# Patient Record
Sex: FEMALE | Race: WHITE | NOT HISPANIC OR LATINO | Employment: OTHER | URBAN - METROPOLITAN AREA
[De-identification: names, ages, dates, MRNs, and addresses within clinical notes are randomized per-mention and may not be internally consistent; named-entity substitution may affect disease eponyms.]

---

## 2018-02-27 ENCOUNTER — HOSPITAL ENCOUNTER (OUTPATIENT)
Dept: RADIOLOGY | Facility: MEDICAL CENTER | Age: 73
End: 2018-02-27

## 2018-02-27 ENCOUNTER — APPOINTMENT (OUTPATIENT)
Dept: RADIOLOGY | Facility: MEDICAL CENTER | Age: 73
DRG: 064 | End: 2018-02-27
Attending: EMERGENCY MEDICINE
Payer: MEDICARE

## 2018-02-27 ENCOUNTER — APPOINTMENT (OUTPATIENT)
Dept: RADIOLOGY | Facility: MEDICAL CENTER | Age: 73
DRG: 064 | End: 2018-02-27
Attending: NEUROLOGICAL SURGERY
Payer: MEDICARE

## 2018-02-27 ENCOUNTER — HOSPITAL ENCOUNTER (INPATIENT)
Facility: MEDICAL CENTER | Age: 73
LOS: 13 days | DRG: 064 | End: 2018-03-13
Attending: EMERGENCY MEDICINE | Admitting: INTERNAL MEDICINE
Payer: MEDICARE

## 2018-02-27 ENCOUNTER — RESOLUTE PROFESSIONAL BILLING HOSPITAL PROF FEE (OUTPATIENT)
Dept: MEDSURG UNIT | Facility: MEDICAL CENTER | Age: 73
End: 2018-02-27
Payer: MEDICARE

## 2018-02-27 DIAGNOSIS — I67.1 CEREBRAL ANEURYSM: ICD-10-CM

## 2018-02-27 DIAGNOSIS — I60.9 SUBARACHNOID HEMORRHAGE (HCC): ICD-10-CM

## 2018-02-27 DIAGNOSIS — R41.0 DELIRIUM: ICD-10-CM

## 2018-02-27 PROBLEM — R41.82 ALTERED MENTAL STATUS, UNSPECIFIED: Status: ACTIVE | Noted: 2018-02-27

## 2018-02-27 PROBLEM — D69.1 PLATELET DYSFUNCTION DUE TO ASPIRIN (HCC): Status: ACTIVE | Noted: 2018-02-27

## 2018-02-27 PROBLEM — E78.5 HYPERLIPIDEMIA: Status: ACTIVE | Noted: 2018-02-27

## 2018-02-27 PROBLEM — R40.4 ALTERED LEVEL OF CONSCIOUSNESS: Status: ACTIVE | Noted: 2018-02-27

## 2018-02-27 PROBLEM — I10 ESSENTIAL HYPERTENSION: Status: ACTIVE | Noted: 2018-02-27

## 2018-02-27 PROBLEM — T39.015A PLATELET DYSFUNCTION DUE TO ASPIRIN (HCC): Status: ACTIVE | Noted: 2018-02-27

## 2018-02-27 LAB
CFT BLD TEG: 3.2 MIN (ref 5–10)
CLOT ANGLE BLD TEG: 72.8 DEGREES (ref 53–72)
CLOT LYSIS 30M P MA LENFR BLD TEG: 3 % (ref 0–8)
CT.EXTRINSIC BLD ROTEM: 1.2 MIN (ref 1–3)
MCF BLD TEG: 65.5 MM (ref 50–70)
PA AA BLD-ACNC: 52.5 %
PA ADP BLD-ACNC: 5.5 %
TEG ALGORITHM TGALG: ABNORMAL

## 2018-02-27 PROCEDURE — G0390 TRAUMA RESPONS W/HOSP CRITI: HCPCS

## 2018-02-27 PROCEDURE — 70496 CT ANGIOGRAPHY HEAD: CPT

## 2018-02-27 PROCEDURE — 85610 PROTHROMBIN TIME: CPT

## 2018-02-27 PROCEDURE — 86140 C-REACTIVE PROTEIN: CPT

## 2018-02-27 PROCEDURE — 85347 COAGULATION TIME ACTIVATED: CPT

## 2018-02-27 PROCEDURE — 84439 ASSAY OF FREE THYROXINE: CPT

## 2018-02-27 PROCEDURE — 82607 VITAMIN B-12: CPT

## 2018-02-27 PROCEDURE — 85730 THROMBOPLASTIN TIME PARTIAL: CPT

## 2018-02-27 PROCEDURE — 86780 TREPONEMA PALLIDUM: CPT

## 2018-02-27 PROCEDURE — 99291 CRITICAL CARE FIRST HOUR: CPT

## 2018-02-27 PROCEDURE — 85025 COMPLETE CBC W/AUTO DIFF WBC: CPT

## 2018-02-27 PROCEDURE — 82140 ASSAY OF AMMONIA: CPT

## 2018-02-27 PROCEDURE — 83735 ASSAY OF MAGNESIUM: CPT

## 2018-02-27 PROCEDURE — 82746 ASSAY OF FOLIC ACID SERUM: CPT

## 2018-02-27 PROCEDURE — 80053 COMPREHEN METABOLIC PANEL: CPT

## 2018-02-27 PROCEDURE — 82550 ASSAY OF CK (CPK): CPT

## 2018-02-27 PROCEDURE — A9270 NON-COVERED ITEM OR SERVICE: HCPCS | Performed by: NEUROLOGICAL SURGERY

## 2018-02-27 PROCEDURE — 84443 ASSAY THYROID STIM HORMONE: CPT

## 2018-02-27 PROCEDURE — 85652 RBC SED RATE AUTOMATED: CPT

## 2018-02-27 PROCEDURE — 84484 ASSAY OF TROPONIN QUANT: CPT

## 2018-02-27 PROCEDURE — 85384 FIBRINOGEN ACTIVITY: CPT

## 2018-02-27 PROCEDURE — 85576 BLOOD PLATELET AGGREGATION: CPT | Mod: 91

## 2018-02-27 PROCEDURE — 700102 HCHG RX REV CODE 250 W/ 637 OVERRIDE(OP): Performed by: NEUROLOGICAL SURGERY

## 2018-02-27 PROCEDURE — G0378 HOSPITAL OBSERVATION PER HR: HCPCS

## 2018-02-27 RX ORDER — ACETAMINOPHEN 500 MG
500 TABLET ORAL EVERY 6 HOURS PRN
Status: DISCONTINUED | OUTPATIENT
Start: 2018-02-27 | End: 2018-02-28

## 2018-02-27 RX ORDER — SIMVASTATIN 20 MG
20 TABLET ORAL EVERY EVENING
COMMUNITY

## 2018-02-27 RX ORDER — LEVETIRACETAM 100 MG/ML
500 SOLUTION ORAL EVERY 12 HOURS
Status: DISCONTINUED | OUTPATIENT
Start: 2018-02-27 | End: 2018-03-02

## 2018-02-27 RX ORDER — LISINOPRIL 20 MG/1
20 TABLET ORAL DAILY
COMMUNITY

## 2018-02-27 RX ORDER — LISINOPRIL 20 MG/1
20 TABLET ORAL DAILY
Status: DISCONTINUED | OUTPATIENT
Start: 2018-02-28 | End: 2018-02-28

## 2018-02-27 RX ORDER — SIMVASTATIN 20 MG
20 TABLET ORAL EVERY EVENING
Status: DISCONTINUED | OUTPATIENT
Start: 2018-02-27 | End: 2018-03-13 | Stop reason: HOSPADM

## 2018-02-27 RX ORDER — ASPIRIN/CALCIUM/MAG/ALUMINUM 325 MG
325 TABLET ORAL DAILY
Status: ON HOLD | COMMUNITY
End: 2018-03-13

## 2018-02-27 RX ADMIN — LEVETIRACETAM 500 MG: 100 SOLUTION ORAL at 23:24

## 2018-02-28 ENCOUNTER — APPOINTMENT (OUTPATIENT)
Dept: RADIOLOGY | Facility: MEDICAL CENTER | Age: 73
DRG: 064 | End: 2018-02-28
Attending: STUDENT IN AN ORGANIZED HEALTH CARE EDUCATION/TRAINING PROGRAM
Payer: MEDICARE

## 2018-02-28 PROBLEM — E87.6 HYPOKALEMIA: Status: ACTIVE | Noted: 2018-02-28

## 2018-02-28 PROBLEM — I67.1 CEREBRAL ANEURYSM: Status: ACTIVE | Noted: 2018-02-28

## 2018-02-28 LAB
ALBUMIN SERPL BCP-MCNC: 3.7 G/DL (ref 3.2–4.9)
ALBUMIN/GLOB SERPL: 1.4 G/DL
ALP SERPL-CCNC: 53 U/L (ref 30–99)
ALT SERPL-CCNC: 13 U/L (ref 2–50)
AMMONIA PLAS-SCNC: 32 UMOL/L (ref 11–45)
ANION GAP SERPL CALC-SCNC: 13 MMOL/L (ref 0–11.9)
APTT PPP: 25.2 SEC (ref 24.7–36)
AST SERPL-CCNC: 22 U/L (ref 12–45)
BASOPHILS # BLD AUTO: 0.3 % (ref 0–1.8)
BASOPHILS # BLD: 0.02 K/UL (ref 0–0.12)
BILIRUB SERPL-MCNC: 0.7 MG/DL (ref 0.1–1.5)
BUN SERPL-MCNC: 23 MG/DL (ref 8–22)
CALCIUM SERPL-MCNC: 9.1 MG/DL (ref 8.5–10.5)
CHLORIDE SERPL-SCNC: 111 MMOL/L (ref 96–112)
CK SERPL-CCNC: 136 U/L (ref 0–154)
CO2 SERPL-SCNC: 20 MMOL/L (ref 20–33)
CREAT SERPL-MCNC: 0.66 MG/DL (ref 0.5–1.4)
CRP SERPL HS-MCNC: 0.69 MG/DL (ref 0–0.75)
EOSINOPHIL # BLD AUTO: 0.03 K/UL (ref 0–0.51)
EOSINOPHIL NFR BLD: 0.4 % (ref 0–6.9)
ERYTHROCYTE [DISTWIDTH] IN BLOOD BY AUTOMATED COUNT: 49.7 FL (ref 35.9–50)
ERYTHROCYTE [SEDIMENTATION RATE] IN BLOOD BY WESTERGREN METHOD: 14 MM/HOUR (ref 0–30)
FOLATE SERPL-MCNC: >23.5 NG/ML
GLOBULIN SER CALC-MCNC: 2.6 G/DL (ref 1.9–3.5)
GLUCOSE SERPL-MCNC: 75 MG/DL (ref 65–99)
HCT VFR BLD AUTO: 39.1 % (ref 37–47)
HGB BLD-MCNC: 12.4 G/DL (ref 12–16)
IMM GRANULOCYTES # BLD AUTO: 0.02 K/UL (ref 0–0.11)
IMM GRANULOCYTES NFR BLD AUTO: 0.3 % (ref 0–0.9)
INR PPP: 1.22 (ref 0.87–1.13)
LYMPHOCYTES # BLD AUTO: 1.61 K/UL (ref 1–4.8)
LYMPHOCYTES NFR BLD: 23.9 % (ref 22–41)
MAGNESIUM SERPL-MCNC: 1.9 MG/DL (ref 1.5–2.5)
MCH RBC QN AUTO: 29.9 PG (ref 27–33)
MCHC RBC AUTO-ENTMCNC: 31.7 G/DL (ref 33.6–35)
MCV RBC AUTO: 94.2 FL (ref 81.4–97.8)
MONOCYTES # BLD AUTO: 0.82 K/UL (ref 0–0.85)
MONOCYTES NFR BLD AUTO: 12.2 % (ref 0–13.4)
NEUTROPHILS # BLD AUTO: 4.23 K/UL (ref 2–7.15)
NEUTROPHILS NFR BLD: 62.9 % (ref 44–72)
NRBC # BLD AUTO: 0 K/UL
NRBC BLD-RTO: 0 /100 WBC
PLATELET # BLD AUTO: 176 K/UL (ref 164–446)
PMV BLD AUTO: 11 FL (ref 9–12.9)
POTASSIUM SERPL-SCNC: 3.6 MMOL/L (ref 3.6–5.5)
PROT SERPL-MCNC: 6.3 G/DL (ref 6–8.2)
PROTHROMBIN TIME: 15.1 SEC (ref 12–14.6)
RBC # BLD AUTO: 4.15 M/UL (ref 4.2–5.4)
SODIUM SERPL-SCNC: 144 MMOL/L (ref 135–145)
T4 FREE SERPL-MCNC: 1.23 NG/DL (ref 0.53–1.43)
TREPONEMA PALLIDUM IGG+IGM AB [PRESENCE] IN SERUM OR PLASMA BY IMMUNOASSAY: NON REACTIVE
TROPONIN I SERPL-MCNC: <0.01 NG/ML (ref 0–0.04)
TSH SERPL DL<=0.005 MIU/L-ACNC: 0.68 UIU/ML (ref 0.38–5.33)
VIT B12 SERPL-MCNC: 1012 PG/ML (ref 211–911)
WBC # BLD AUTO: 6.7 K/UL (ref 4.8–10.8)

## 2018-02-28 PROCEDURE — A9270 NON-COVERED ITEM OR SERVICE: HCPCS | Performed by: STUDENT IN AN ORGANIZED HEALTH CARE EDUCATION/TRAINING PROGRAM

## 2018-02-28 PROCEDURE — 700117 HCHG RX CONTRAST REV CODE 255: Performed by: NEUROLOGICAL SURGERY

## 2018-02-28 PROCEDURE — 93886 INTRACRANIAL COMPLETE STUDY: CPT | Mod: 26 | Performed by: SURGERY

## 2018-02-28 PROCEDURE — G8997 SWALLOW GOAL STATUS: HCPCS | Mod: CH

## 2018-02-28 PROCEDURE — A9270 NON-COVERED ITEM OR SERVICE: HCPCS | Performed by: NEUROLOGICAL SURGERY

## 2018-02-28 PROCEDURE — 770022 HCHG ROOM/CARE - ICU (200)

## 2018-02-28 PROCEDURE — 700111 HCHG RX REV CODE 636 W/ 250 OVERRIDE (IP): Performed by: STUDENT IN AN ORGANIZED HEALTH CARE EDUCATION/TRAINING PROGRAM

## 2018-02-28 PROCEDURE — 700102 HCHG RX REV CODE 250 W/ 637 OVERRIDE(OP): Performed by: INTERNAL MEDICINE

## 2018-02-28 PROCEDURE — 700102 HCHG RX REV CODE 250 W/ 637 OVERRIDE(OP): Performed by: NEUROLOGICAL SURGERY

## 2018-02-28 PROCEDURE — 93886 INTRACRANIAL COMPLETE STUDY: CPT

## 2018-02-28 PROCEDURE — A9270 NON-COVERED ITEM OR SERVICE: HCPCS | Performed by: INTERNAL MEDICINE

## 2018-02-28 PROCEDURE — 92610 EVALUATE SWALLOWING FUNCTION: CPT

## 2018-02-28 PROCEDURE — 700112 HCHG RX REV CODE 229: Performed by: NEUROLOGICAL SURGERY

## 2018-02-28 PROCEDURE — 700105 HCHG RX REV CODE 258: Performed by: NEUROLOGICAL SURGERY

## 2018-02-28 PROCEDURE — G8996 SWALLOW CURRENT STATUS: HCPCS | Mod: CI

## 2018-02-28 PROCEDURE — 700102 HCHG RX REV CODE 250 W/ 637 OVERRIDE(OP): Performed by: STUDENT IN AN ORGANIZED HEALTH CARE EDUCATION/TRAINING PROGRAM

## 2018-02-28 RX ORDER — FAMOTIDINE 20 MG/1
20 TABLET, FILM COATED ORAL 2 TIMES DAILY
Status: DISCONTINUED | OUTPATIENT
Start: 2018-02-28 | End: 2018-02-28

## 2018-02-28 RX ORDER — MAGNESIUM SULFATE 1 G/100ML
1 INJECTION INTRAVENOUS ONCE
Status: COMPLETED | OUTPATIENT
Start: 2018-02-28 | End: 2018-02-28

## 2018-02-28 RX ORDER — LABETALOL HYDROCHLORIDE 5 MG/ML
10 INJECTION, SOLUTION INTRAVENOUS
Status: DISCONTINUED | OUTPATIENT
Start: 2018-02-28 | End: 2018-03-13 | Stop reason: HOSPADM

## 2018-02-28 RX ORDER — HYDROMORPHONE HYDROCHLORIDE 2 MG/ML
0.5 INJECTION, SOLUTION INTRAMUSCULAR; INTRAVENOUS; SUBCUTANEOUS
Status: DISCONTINUED | OUTPATIENT
Start: 2018-02-28 | End: 2018-03-08

## 2018-02-28 RX ORDER — OXYCODONE HYDROCHLORIDE 5 MG/1
5 TABLET ORAL
Status: DISCONTINUED | OUTPATIENT
Start: 2018-02-28 | End: 2018-03-13 | Stop reason: HOSPADM

## 2018-02-28 RX ORDER — CLONIDINE HYDROCHLORIDE 0.1 MG/1
0.1 TABLET ORAL EVERY 4 HOURS PRN
Status: DISCONTINUED | OUTPATIENT
Start: 2018-02-28 | End: 2018-03-02

## 2018-02-28 RX ORDER — ENEMA 19; 7 G/133ML; G/133ML
1 ENEMA RECTAL
Status: DISCONTINUED | OUTPATIENT
Start: 2018-02-28 | End: 2018-02-28

## 2018-02-28 RX ORDER — AMOXICILLIN 250 MG
1 CAPSULE ORAL
Status: DISCONTINUED | OUTPATIENT
Start: 2018-02-28 | End: 2018-02-28

## 2018-02-28 RX ORDER — AMOXICILLIN 250 MG
1 CAPSULE ORAL NIGHTLY
Status: DISCONTINUED | OUTPATIENT
Start: 2018-02-28 | End: 2018-02-28

## 2018-02-28 RX ORDER — LISINOPRIL 20 MG/1
20 TABLET ORAL DAILY
Status: DISCONTINUED | OUTPATIENT
Start: 2018-03-02 | End: 2018-03-13 | Stop reason: HOSPADM

## 2018-02-28 RX ORDER — BISACODYL 10 MG
10 SUPPOSITORY, RECTAL RECTAL
Status: DISCONTINUED | OUTPATIENT
Start: 2018-02-28 | End: 2018-02-28

## 2018-02-28 RX ORDER — HYDRALAZINE HYDROCHLORIDE 20 MG/ML
10-20 INJECTION INTRAMUSCULAR; INTRAVENOUS EVERY 4 HOURS PRN
Status: DISCONTINUED | OUTPATIENT
Start: 2018-02-28 | End: 2018-03-02

## 2018-02-28 RX ORDER — POTASSIUM CHLORIDE 20 MEQ/1
40 TABLET, EXTENDED RELEASE ORAL ONCE
Status: COMPLETED | OUTPATIENT
Start: 2018-02-28 | End: 2018-02-28

## 2018-02-28 RX ORDER — BISACODYL 10 MG
10 SUPPOSITORY, RECTAL RECTAL
Status: DISCONTINUED | OUTPATIENT
Start: 2018-02-28 | End: 2018-03-13 | Stop reason: HOSPADM

## 2018-02-28 RX ORDER — DEXAMETHASONE SODIUM PHOSPHATE 4 MG/ML
4 INJECTION, SOLUTION INTRA-ARTICULAR; INTRALESIONAL; INTRAMUSCULAR; INTRAVENOUS; SOFT TISSUE
Status: DISCONTINUED | OUTPATIENT
Start: 2018-02-28 | End: 2018-03-13 | Stop reason: HOSPADM

## 2018-02-28 RX ORDER — SCOLOPAMINE TRANSDERMAL SYSTEM 1 MG/1
1 PATCH, EXTENDED RELEASE TRANSDERMAL
Status: DISCONTINUED | OUTPATIENT
Start: 2018-02-28 | End: 2018-03-13 | Stop reason: HOSPADM

## 2018-02-28 RX ORDER — DOCUSATE SODIUM 100 MG/1
100 CAPSULE, LIQUID FILLED ORAL 2 TIMES DAILY
Status: DISCONTINUED | OUTPATIENT
Start: 2018-02-28 | End: 2018-03-13 | Stop reason: HOSPADM

## 2018-02-28 RX ORDER — ONDANSETRON 2 MG/ML
4 INJECTION INTRAMUSCULAR; INTRAVENOUS EVERY 4 HOURS PRN
Status: DISCONTINUED | OUTPATIENT
Start: 2018-02-28 | End: 2018-03-13 | Stop reason: HOSPADM

## 2018-02-28 RX ORDER — POLYETHYLENE GLYCOL 3350 17 G/17G
1 POWDER, FOR SOLUTION ORAL 2 TIMES DAILY PRN
Status: DISCONTINUED | OUTPATIENT
Start: 2018-02-28 | End: 2018-03-13 | Stop reason: HOSPADM

## 2018-02-28 RX ORDER — OXYCODONE HYDROCHLORIDE 10 MG/1
10 TABLET ORAL
Status: DISCONTINUED | OUTPATIENT
Start: 2018-02-28 | End: 2018-03-13 | Stop reason: HOSPADM

## 2018-02-28 RX ORDER — DIPHENHYDRAMINE HYDROCHLORIDE 50 MG/ML
25 INJECTION INTRAMUSCULAR; INTRAVENOUS EVERY 6 HOURS PRN
Status: DISCONTINUED | OUTPATIENT
Start: 2018-02-28 | End: 2018-03-13 | Stop reason: HOSPADM

## 2018-02-28 RX ORDER — SODIUM CHLORIDE 9 MG/ML
INJECTION, SOLUTION INTRAVENOUS CONTINUOUS
Status: DISCONTINUED | OUTPATIENT
Start: 2018-02-28 | End: 2018-03-01

## 2018-02-28 RX ORDER — POLYETHYLENE GLYCOL 3350 17 G/17G
1 POWDER, FOR SOLUTION ORAL
Status: DISCONTINUED | OUTPATIENT
Start: 2018-02-28 | End: 2018-02-28

## 2018-02-28 RX ORDER — HYDRALAZINE HYDROCHLORIDE 20 MG/ML
10 INJECTION INTRAMUSCULAR; INTRAVENOUS
Status: DISCONTINUED | OUTPATIENT
Start: 2018-02-28 | End: 2018-03-03

## 2018-02-28 RX ORDER — ACETAMINOPHEN 325 MG/1
650 TABLET ORAL EVERY 6 HOURS PRN
Status: DISCONTINUED | OUTPATIENT
Start: 2018-02-28 | End: 2018-03-13 | Stop reason: HOSPADM

## 2018-02-28 RX ORDER — AMOXICILLIN 250 MG
2 CAPSULE ORAL 2 TIMES DAILY
Status: DISCONTINUED | OUTPATIENT
Start: 2018-02-28 | End: 2018-03-13 | Stop reason: HOSPADM

## 2018-02-28 RX ORDER — ACETAMINOPHEN 500 MG
1000 TABLET ORAL EVERY 6 HOURS
Status: DISCONTINUED | OUTPATIENT
Start: 2018-02-28 | End: 2018-02-28

## 2018-02-28 RX ORDER — NIMODIPINE 30 MG/1
60 CAPSULE, LIQUID FILLED ORAL EVERY 4 HOURS
Status: DISCONTINUED | OUTPATIENT
Start: 2018-02-28 | End: 2018-03-13 | Stop reason: HOSPADM

## 2018-02-28 RX ADMIN — ACETAMINOPHEN 650 MG: 325 TABLET, FILM COATED ORAL at 20:20

## 2018-02-28 RX ADMIN — MAGNESIUM SULFATE IN DEXTROSE 1 G: 10 INJECTION, SOLUTION INTRAVENOUS at 08:45

## 2018-02-28 RX ADMIN — SODIUM CHLORIDE: 9 INJECTION, SOLUTION INTRAVENOUS at 23:22

## 2018-02-28 RX ADMIN — NIMODIPINE 60 MG: 30 CAPSULE ORAL at 21:21

## 2018-02-28 RX ADMIN — NIMODIPINE 60 MG: 30 CAPSULE ORAL at 13:59

## 2018-02-28 RX ADMIN — NIMODIPINE 60 MG: 30 CAPSULE ORAL at 05:56

## 2018-02-28 RX ADMIN — STANDARDIZED SENNA CONCENTRATE AND DOCUSATE SODIUM 2 TABLET: 8.6; 5 TABLET, FILM COATED ORAL at 08:45

## 2018-02-28 RX ADMIN — ACETAMINOPHEN 1000 MG: 500 TABLET ORAL at 04:39

## 2018-02-28 RX ADMIN — NIMODIPINE 60 MG: 30 CAPSULE ORAL at 03:45

## 2018-02-28 RX ADMIN — NIMODIPINE 60 MG: 30 CAPSULE ORAL at 10:09

## 2018-02-28 RX ADMIN — IOHEXOL 100 ML: 350 INJECTION, SOLUTION INTRAVENOUS at 00:03

## 2018-02-28 RX ADMIN — LEVETIRACETAM 500 MG: 100 SOLUTION ORAL at 21:22

## 2018-02-28 RX ADMIN — ACETAMINOPHEN 650 MG: 325 TABLET, FILM COATED ORAL at 14:01

## 2018-02-28 RX ADMIN — DOCUSATE SODIUM 100 MG: 100 CAPSULE ORAL at 08:45

## 2018-02-28 RX ADMIN — SODIUM CHLORIDE: 9 INJECTION, SOLUTION INTRAVENOUS at 02:21

## 2018-02-28 RX ADMIN — LEVETIRACETAM 500 MG: 100 SOLUTION ORAL at 08:45

## 2018-02-28 RX ADMIN — SIMVASTATIN 20 MG: 20 TABLET, FILM COATED ORAL at 21:21

## 2018-02-28 RX ADMIN — POTASSIUM CHLORIDE 40 MEQ: 1500 TABLET, EXTENDED RELEASE ORAL at 08:45

## 2018-02-28 RX ADMIN — NIMODIPINE 60 MG: 30 CAPSULE ORAL at 18:15

## 2018-02-28 ASSESSMENT — PATIENT HEALTH QUESTIONNAIRE - PHQ9
7. TROUBLE CONCENTRATING ON THINGS, SUCH AS READING THE NEWSPAPER OR WATCHING TELEVISION: NOT AT ALL
5. POOR APPETITE OR OVEREATING: SEVERAL DAYS
4. FEELING TIRED OR HAVING LITTLE ENERGY: SEVERAL DAYS
8. MOVING OR SPEAKING SO SLOWLY THAT OTHER PEOPLE COULD HAVE NOTICED. OR THE OPPOSITE, BEING SO FIGETY OR RESTLESS THAT YOU HAVE BEEN MOVING AROUND A LOT MORE THAN USUAL: NOT AT ALL
2. FEELING DOWN, DEPRESSED, IRRITABLE, OR HOPELESS: SEVERAL DAYS
SUM OF ALL RESPONSES TO PHQ QUESTIONS 1-9: 5
3. TROUBLE FALLING OR STAYING ASLEEP OR SLEEPING TOO MUCH: SEVERAL DAYS
SUM OF ALL RESPONSES TO PHQ9 QUESTIONS 1 AND 2: 2
1. LITTLE INTEREST OR PLEASURE IN DOING THINGS: SEVERAL DAYS
9. THOUGHTS THAT YOU WOULD BE BETTER OFF DEAD, OR OF HURTING YOURSELF: NOT AT ALL
6. FEELING BAD ABOUT YOURSELF - OR THAT YOU ARE A FAILURE OR HAVE LET YOURSELF OR YOUR FAMILY DOWN: NOT AL ALL

## 2018-02-28 ASSESSMENT — ENCOUNTER SYMPTOMS
SORE THROAT: 0
HEADACHES: 1
MUSCULOSKELETAL NEGATIVE: 1
CARDIOVASCULAR NEGATIVE: 1
ABDOMINAL PAIN: 0
DIAPHORESIS: 1
BLURRED VISION: 0
MYALGIAS: 0
SPUTUM PRODUCTION: 0
VOMITING: 0
CHILLS: 0
SENSORY CHANGE: 0
HEADACHES: 0
PALPITATIONS: 0
EYES NEGATIVE: 1
DIZZINESS: 0
TREMORS: 0
WEIGHT LOSS: 0
PSYCHIATRIC NEGATIVE: 1
STRIDOR: 0
SENSORY CHANGE: 1
DOUBLE VISION: 1
EYE DISCHARGE: 0
TINGLING: 0
FEVER: 0
WEAKNESS: 0
RESPIRATORY NEGATIVE: 1
SHORTNESS OF BREATH: 0
NECK PAIN: 0
GASTROINTESTINAL NEGATIVE: 1
NAUSEA: 0
BACK PAIN: 0
WHEEZING: 0
HALLUCINATIONS: 1
DEPRESSION: 0

## 2018-02-28 ASSESSMENT — LIFESTYLE VARIABLES
ALCOHOL_USE: NO
EVER_SMOKED: YES
SUBSTANCE_ABUSE: 0

## 2018-02-28 ASSESSMENT — PAIN SCALES - GENERAL
PAINLEVEL_OUTOF10: 2
PAINLEVEL_OUTOF10: 3
PAINLEVEL_OUTOF10: 0
PAINLEVEL_OUTOF10: 4
PAINLEVEL_OUTOF10: 5

## 2018-02-28 NOTE — ED NOTES
Pt sleeping,  Wakes easily.  Medicated per MAR,  Updated on POC.  Denies needs.  Will cont to monitor.
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
## 2018-02-28 NOTE — CARE PLAN
Problem: Communication  Goal: The ability to communicate needs accurately and effectively will improve  Outcome: PROGRESSING AS EXPECTED  Pt alert, able to communicate needs effectively.    Problem: Safety  Goal: Will remain free from falls  Outcome: PROGRESSING AS EXPECTED  Pt remains free from falls throughout hospitalization - bed in lowest position, bed alarm on, treaded socks and appropriate rails in use, call bell within reach. Pt rounded on hourly to address any needs.

## 2018-02-28 NOTE — ED NOTES
Med rec updated and complete.  Allergies reviewed.  Pt denies antibiotic use  In last 30 days.  All morning doses taken

## 2018-02-28 NOTE — DISCHARGE PLANNING
Medical Social Work    Ongoing: MSW contacted pt's son-in-law Ashvin and provided him with pt's ER room number and nurses station number.

## 2018-02-28 NOTE — DIETARY
"Nutrition services: Day 0 of admit.  Molly Awan is a 72 y.o. female with admitting DX of Altered level of consciousness  Subarachnoid hemorrhage (CMS-HCC), Cerebral aneurysm.  Patient with poor PO and weight loss noted in nutrition admit screen. I visited with patient at bedside this afternoon who reports that in the last year she has lost about 50 pounds.  Her usual body weight was 280 pounds previously.  She attributes this to her  passing away and that she has been eating less.  Patient currently NPO for procedure per chart review and SLP eval has been ordered. Patient reports a decreased appetite at this time.    Assessment:  Height: 170.2 cm (5' 7\")  Weight: 107.2 kg (236 lb 5.3 oz)  Body mass index is 37.02 kg/m².  Diet/Intake: NPO    Evaluation:   1. ~18% unintentional weight loss in the last year.  Insignificant but worth noting.     Malnutrition Risk: Patient at risk for malnutrition related to weight loss in the last year and decreased appetite.     Recommendations/Plan:  1. Advance diet as medically feasible  2. Encourage intake of meals and snacks once diet advances.   3. Document intake of all meals and snacks  as % taken in ADL's to provide interdisciplinary communication across all shifts.   4. Monitor weight.  5. Nutrition rep will continue to see patient for ongoing meal and snack preferences.     RD will monitor.          "
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
## 2018-02-28 NOTE — ASSESSMENT & PLAN NOTE
- Resolved,.  -Pt A 0X4, this am.  - Patient oriented to person, place and time. Since patient was transferred to the floor no problems with confusion, agitation, no need for restrains.   - Patient is cooperative and follows commands.

## 2018-02-28 NOTE — CARE PLAN
Problem: Nutritional:  Goal: Achieve adequate nutritional intake  Diet to advance and patient will consume >50% of meals  Outcome: NOT MET

## 2018-02-28 NOTE — THERAPY
SPEECH THERAPY CONTACT NOTE:     Orders received for clinical swallow evaluation. Spoke with RN. Pt made NPO, as she may have procedure this afternoon. SLP will hold CSE and re-attempt later as able and appropriate. Thank you.
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
## 2018-02-28 NOTE — ASSESSMENT & PLAN NOTE
Plan:  -Neuro checks  -SCDs.   - continue nimodipine for 4 more days   -On aspiration seizure fall precautions    - angiogram done on 3/12/18 - no complications   - Neurosurgery, Dr Noguera agree with discharge. Patient will need follow up with them. No surgery indicated at this time   - follow up with Neurosurgery outpatient
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
## 2018-02-28 NOTE — PROGRESS NOTES
Neurosurgery Progress Note    Subjective:  States new double vision today, but only with distant vision, not near vision.  Some HA, but better than yesterday.  No other new symptoms. Has not eaten this am.    Exam:  Oriented to self and place, month/year  EOMI  Pupils 3 mm, reactive  No drift, FCx4    BP  Min: 123/50  Max: 130/56  Pulse  Av  Min: 60  Max: 87  Resp  Av.1  Min: 14  Max: 39  Temp  Av.6 °C (97.9 °F)  Min: 36.5 °C (97.7 °F)  Max: 36.7 °C (98.1 °F)  SpO2  Av.7 %  Min: 92 %  Max: 100 %    No Data Recorded    Recent Labs      18   2330   WBC  6.7   RBC  4.15*   HEMOGLOBIN  12.4   HEMATOCRIT  39.1   MCV  94.2   MCH  29.9   MCHC  31.7*   RDW  49.7   PLATELETCT  176   MPV  11.0     Recent Labs      18   2330   SODIUM  144   POTASSIUM  3.6   CHLORIDE  111   CO2  20   GLUCOSE  75   BUN  23*   CPKTOTAL  136     Recent Labs      18   2330   APTT  25.2   INR  1.22*     Recent Labs      18   2112   REACTMIN  3.2*   CLOTKINET  1.2   CLOTANGL  72.8*   MAXCLOTS  65.5   PTA09GEG  3.0   PRCINADP  5.5   PRCINAA  52.5       Intake/Output       18 0700 - 18 0659 18 07 - 18 0659       Total 1900-0659 Total       Intake    P.O.  --  200 200  --  -- --    P.O. -- 200 200 -- -- --    I.V.  --  350 350  --  -- --    Pre-Hospital Volume -- 0 0 -- -- --    Trauma Resuscitation Volume -- 0 0 -- -- --    IV Volume (NS) -- 350 350 -- -- --    Blood  --  0 0  --  -- --    PRBC Total Volume (Non-Barcoded) -- 0 0 -- -- --    FFP Total Volume (Non-Barcoded) -- 0 0 -- -- --    Platelets Total Volume (Non-Barcoded) -- 0 0 -- -- --    Cryoprecipitate (Pooled) Total Volume (Non-Barcoded) -- 0 0 -- -- --    Cryoprecipitate (Single) Total Volume (Non-Barcoded) -- 0 0 -- -- --    Total Intake -- 550 550 -- -- --       Output    Urine  --  300 300  --  -- --    Void (ml) -- 300 300 -- -- --    Other  --  0 0  --  -- --    Pre-Hospital Output -- 0 0  -- -- --    Trauma Resuscitation Output -- 0 0 -- -- --    Blood  --  0 0  --  -- --    Est. Blood Loss (mL) -- 0 0 -- -- --    Total Output -- 300 300 -- -- --       Net I/O     -- 250 250 -- -- --            Intake/Output Summary (Last 24 hours) at 02/28/18 0912  Last data filed at 02/28/18 0600   Gross per 24 hour   Intake              550 ml   Output              300 ml   Net              250 ml            • hydrALAZINE  10-20 mg Q4HRS PRN   • senna-docusate  2 Tab BID    And   • magnesium hydroxide  30 mL QDAY PRN    And   • bisacodyl  10 mg QDAY PRN   • acetaminophen  650 mg Q6HRS PRN   • [START ON 3/2/2018] lisinopril  20 mg DAILY   • niMODipine  60 mg Q4HRS   • NS   Continuous   • Pharmacy Consult Request  1 Each PRN   • oxyCODONE immediate-release  5 mg Q3HRS PRN   • oxyCODONE immediate release  10 mg Q3HRS PRN   • MD ALERT...Do not administer NSAIDS or ASPIRIN unless ORDERED By Neurosurgery  1 Each PRN   • ondansetron  4 mg Q4HRS PRN   • dexamethasone  4 mg Once PRN   • diphenhydrAMINE  25 mg Q6HRS PRN   • scopolamine  1 Patch Q72HRS PRN   • labetalol  10 mg Q10 MIN PRN   • hydrALAZINE  10 mg Q10 MIN PRN   • cloNIDine  0.1 mg Q4HRS PRN   • niCARdipine infusion  0-15 mg/hr Continuous   • docusate sodium  100 mg BID   • polyethylene glycol/lytes  1 Packet BID PRN   • HYDROmorphone  0.5 mg Q3HRS PRN   • magnesium sulfate  1 g Once   • levETIRAcetam  500 mg Q12HRS   • simvastatin  20 mg Q EVENING     Imaging:  CTA shows left MCA occlusion with distal left MCA aneurysm    Assessment and Plan:  Hospital day #1 SAH/aneurysm; post-SAH day #1  Prophylactic anticoagulation: no         Start date/time: tbd    Formal Angio; possible moyamoya type pattern  NPO  TCD- no sign of spasm at this time  Nimodipine

## 2018-02-28 NOTE — PROGRESS NOTES
Per [facility] Diagnostic Carotid Cerebral Angiogram to be done 3/1/18. Advised nurse Kobi and he will have patient NPO midnight and coag protocol given. Patient is scheduled for 1300 3/1/18.
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
## 2018-03-01 ENCOUNTER — APPOINTMENT (OUTPATIENT)
Dept: RADIOLOGY | Facility: MEDICAL CENTER | Age: 73
DRG: 064 | End: 2018-03-01
Attending: NURSE PRACTITIONER
Payer: MEDICARE

## 2018-03-01 PROBLEM — R41.82 ALTERED MENTAL STATUS: Status: ACTIVE | Noted: 2018-02-27

## 2018-03-01 PROBLEM — R41.82 ALTERED MENTAL STATUS, UNSPECIFIED: Status: RESOLVED | Noted: 2018-02-27 | Resolved: 2018-03-01

## 2018-03-01 LAB
ANION GAP SERPL CALC-SCNC: 8 MMOL/L (ref 0–11.9)
APTT PPP: 23.5 SEC (ref 24.7–36)
BUN SERPL-MCNC: 17 MG/DL (ref 8–22)
CALCIUM SERPL-MCNC: 8.5 MG/DL (ref 8.5–10.5)
CFT BLD TEG: 3.3 MIN (ref 5–10)
CHLORIDE SERPL-SCNC: 110 MMOL/L (ref 96–112)
CLOT ANGLE BLD TEG: 73.5 DEGREES (ref 53–72)
CLOT LYSIS 30M P MA LENFR BLD TEG: 0 % (ref 0–8)
CO2 SERPL-SCNC: 24 MMOL/L (ref 20–33)
CREAT SERPL-MCNC: 0.6 MG/DL (ref 0.5–1.4)
CT.EXTRINSIC BLD ROTEM: 1.2 MIN (ref 1–3)
ERYTHROCYTE [DISTWIDTH] IN BLOOD BY AUTOMATED COUNT: 48 FL (ref 35.9–50)
GLUCOSE SERPL-MCNC: 105 MG/DL (ref 65–99)
HCT VFR BLD AUTO: 39.3 % (ref 37–47)
HGB BLD-MCNC: 12.7 G/DL (ref 12–16)
INR PPP: 1.04 (ref 0.87–1.13)
MAGNESIUM SERPL-MCNC: 1.7 MG/DL (ref 1.5–2.5)
MCF BLD TEG: 65.4 MM (ref 50–70)
MCH RBC QN AUTO: 30.2 PG (ref 27–33)
MCHC RBC AUTO-ENTMCNC: 32.3 G/DL (ref 33.6–35)
MCV RBC AUTO: 93.6 FL (ref 81.4–97.8)
PA AA BLD-ACNC: 33.3 %
PA ADP BLD-ACNC: 0 %
PLATELET # BLD AUTO: 165 K/UL (ref 164–446)
PMV BLD AUTO: 11.2 FL (ref 9–12.9)
POTASSIUM SERPL-SCNC: 3.4 MMOL/L (ref 3.6–5.5)
PROTHROMBIN TIME: 13.3 SEC (ref 12–14.6)
RBC # BLD AUTO: 4.2 M/UL (ref 4.2–5.4)
SODIUM SERPL-SCNC: 142 MMOL/L (ref 135–145)
TEG ALGORITHM TGALG: ABNORMAL
WBC # BLD AUTO: 6.2 K/UL (ref 4.8–10.8)

## 2018-03-01 PROCEDURE — 85730 THROMBOPLASTIN TIME PARTIAL: CPT

## 2018-03-01 PROCEDURE — 99153 MOD SED SAME PHYS/QHP EA: CPT

## 2018-03-01 PROCEDURE — 85610 PROTHROMBIN TIME: CPT

## 2018-03-01 PROCEDURE — 85027 COMPLETE CBC AUTOMATED: CPT

## 2018-03-01 PROCEDURE — 85384 FIBRINOGEN ACTIVITY: CPT

## 2018-03-01 PROCEDURE — B31F1ZZ FLUOROSCOPY OF LEFT VERTEBRAL ARTERY USING LOW OSMOLAR CONTRAST: ICD-10-PCS | Performed by: RADIOLOGY

## 2018-03-01 PROCEDURE — A9270 NON-COVERED ITEM OR SERVICE: HCPCS | Performed by: INTERNAL MEDICINE

## 2018-03-01 PROCEDURE — 700102 HCHG RX REV CODE 250 W/ 637 OVERRIDE(OP): Performed by: INTERNAL MEDICINE

## 2018-03-01 PROCEDURE — 700111 HCHG RX REV CODE 636 W/ 250 OVERRIDE (IP)

## 2018-03-01 PROCEDURE — 85347 COAGULATION TIME ACTIVATED: CPT

## 2018-03-01 PROCEDURE — B3181ZZ FLUOROSCOPY OF BILATERAL INTERNAL CAROTID ARTERIES USING LOW OSMOLAR CONTRAST: ICD-10-PCS | Performed by: RADIOLOGY

## 2018-03-01 PROCEDURE — A9270 NON-COVERED ITEM OR SERVICE: HCPCS | Performed by: STUDENT IN AN ORGANIZED HEALTH CARE EDUCATION/TRAINING PROGRAM

## 2018-03-01 PROCEDURE — B3151ZZ FLUOROSCOPY OF BILATERAL COMMON CAROTID ARTERIES USING LOW OSMOLAR CONTRAST: ICD-10-PCS | Performed by: RADIOLOGY

## 2018-03-01 PROCEDURE — 700111 HCHG RX REV CODE 636 W/ 250 OVERRIDE (IP): Performed by: INTERNAL MEDICINE

## 2018-03-01 PROCEDURE — 93888 INTRACRANIAL LIMITED STUDY: CPT | Mod: 26 | Performed by: SURGERY

## 2018-03-01 PROCEDURE — 85576 BLOOD PLATELET AGGREGATION: CPT

## 2018-03-01 PROCEDURE — 80048 BASIC METABOLIC PNL TOTAL CA: CPT

## 2018-03-01 PROCEDURE — 770022 HCHG ROOM/CARE - ICU (200)

## 2018-03-01 PROCEDURE — 51798 US URINE CAPACITY MEASURE: CPT

## 2018-03-01 PROCEDURE — B31Q1ZZ FLUOROSCOPY OF CERVICO-CEREBRAL ARCH USING LOW OSMOLAR CONTRAST: ICD-10-PCS | Performed by: RADIOLOGY

## 2018-03-01 PROCEDURE — 700101 HCHG RX REV CODE 250: Performed by: INTERNAL MEDICINE

## 2018-03-01 PROCEDURE — 83735 ASSAY OF MAGNESIUM: CPT

## 2018-03-01 PROCEDURE — 700102 HCHG RX REV CODE 250 W/ 637 OVERRIDE(OP): Performed by: STUDENT IN AN ORGANIZED HEALTH CARE EDUCATION/TRAINING PROGRAM

## 2018-03-01 PROCEDURE — 93888 INTRACRANIAL LIMITED STUDY: CPT

## 2018-03-01 RX ORDER — MIDAZOLAM HYDROCHLORIDE 1 MG/ML
INJECTION INTRAMUSCULAR; INTRAVENOUS
Status: COMPLETED
Start: 2018-03-01 | End: 2018-03-01

## 2018-03-01 RX ORDER — CEFAZOLIN SODIUM 1 G/3ML
INJECTION, POWDER, FOR SOLUTION INTRAMUSCULAR; INTRAVENOUS
Status: COMPLETED
Start: 2018-03-01 | End: 2018-03-01

## 2018-03-01 RX ORDER — MIDAZOLAM HYDROCHLORIDE 1 MG/ML
.5-2 INJECTION INTRAMUSCULAR; INTRAVENOUS PRN
Status: ACTIVE | OUTPATIENT
Start: 2018-03-01 | End: 2018-03-01

## 2018-03-01 RX ORDER — MAGNESIUM SULFATE HEPTAHYDRATE 40 MG/ML
2 INJECTION, SOLUTION INTRAVENOUS ONCE
Status: COMPLETED | OUTPATIENT
Start: 2018-03-01 | End: 2018-03-01

## 2018-03-01 RX ORDER — ONDANSETRON 2 MG/ML
4 INJECTION INTRAMUSCULAR; INTRAVENOUS PRN
Status: ACTIVE | OUTPATIENT
Start: 2018-03-01 | End: 2018-03-01

## 2018-03-01 RX ORDER — CEFAZOLIN SODIUM 2 G/100ML
2 INJECTION, SOLUTION INTRAVENOUS ONCE
Status: COMPLETED | OUTPATIENT
Start: 2018-03-01 | End: 2018-03-01

## 2018-03-01 RX ORDER — SODIUM CHLORIDE 9 MG/ML
500 INJECTION, SOLUTION INTRAVENOUS
Status: ACTIVE | OUTPATIENT
Start: 2018-03-01 | End: 2018-03-01

## 2018-03-01 RX ORDER — SODIUM CHLORIDE AND POTASSIUM CHLORIDE 300; 900 MG/100ML; MG/100ML
INJECTION, SOLUTION INTRAVENOUS CONTINUOUS
Status: DISCONTINUED | OUTPATIENT
Start: 2018-03-01 | End: 2018-03-04

## 2018-03-01 RX ADMIN — ACETAMINOPHEN 650 MG: 325 TABLET, FILM COATED ORAL at 16:37

## 2018-03-01 RX ADMIN — MIDAZOLAM HYDROCHLORIDE 0.5 MG: 1 INJECTION INTRAMUSCULAR; INTRAVENOUS at 08:48

## 2018-03-01 RX ADMIN — NIMODIPINE 60 MG: 30 CAPSULE ORAL at 17:51

## 2018-03-01 RX ADMIN — NIMODIPINE 60 MG: 30 CAPSULE ORAL at 10:04

## 2018-03-01 RX ADMIN — LEVETIRACETAM 500 MG: 100 SOLUTION ORAL at 08:15

## 2018-03-01 RX ADMIN — NIMODIPINE 60 MG: 30 CAPSULE ORAL at 06:18

## 2018-03-01 RX ADMIN — LEVETIRACETAM 500 MG: 100 SOLUTION ORAL at 21:44

## 2018-03-01 RX ADMIN — SIMVASTATIN 20 MG: 20 TABLET, FILM COATED ORAL at 21:36

## 2018-03-01 RX ADMIN — NIMODIPINE 60 MG: 30 CAPSULE ORAL at 21:36

## 2018-03-01 RX ADMIN — MAGNESIUM SULFATE IN WATER 2 G: 40 INJECTION, SOLUTION INTRAVENOUS at 10:03

## 2018-03-01 RX ADMIN — ACETAMINOPHEN 650 MG: 325 TABLET, FILM COATED ORAL at 06:18

## 2018-03-01 RX ADMIN — CEFAZOLIN 2000 MG: 330 INJECTION, POWDER, FOR SOLUTION INTRAMUSCULAR; INTRAVENOUS at 08:58

## 2018-03-01 RX ADMIN — NIMODIPINE 60 MG: 30 CAPSULE ORAL at 02:36

## 2018-03-01 RX ADMIN — ACETAMINOPHEN 650 MG: 325 TABLET, FILM COATED ORAL at 22:19

## 2018-03-01 RX ADMIN — FENTANYL CITRATE 12.5 MCG: 50 INJECTION, SOLUTION INTRAMUSCULAR; INTRAVENOUS at 08:48

## 2018-03-01 RX ADMIN — MIDAZOLAM 0.5 MG: 1 INJECTION INTRAMUSCULAR; INTRAVENOUS at 08:48

## 2018-03-01 RX ADMIN — NIMODIPINE 60 MG: 30 CAPSULE ORAL at 14:03

## 2018-03-01 RX ADMIN — POTASSIUM CHLORIDE AND SODIUM CHLORIDE: 900; 300 INJECTION, SOLUTION INTRAVENOUS at 08:14

## 2018-03-01 RX ADMIN — STANDARDIZED SENNA CONCENTRATE AND DOCUSATE SODIUM 2 TABLET: 8.6; 5 TABLET, FILM COATED ORAL at 21:36

## 2018-03-01 ASSESSMENT — ENCOUNTER SYMPTOMS
SHORTNESS OF BREATH: 0
NAUSEA: 0
ABDOMINAL PAIN: 0
DIZZINESS: 0
WEAKNESS: 0
CHILLS: 0
SENSORY CHANGE: 0
PALPITATIONS: 0
TREMORS: 0
FEVER: 0
HEADACHES: 1
VOMITING: 0
COUGH: 0
SPEECH CHANGE: 0
LOSS OF CONSCIOUSNESS: 0
CONSTIPATION: 0
WHEEZING: 0
TINGLING: 0
EYE PAIN: 0
PHOTOPHOBIA: 0

## 2018-03-01 ASSESSMENT — PAIN SCALES - GENERAL
PAINLEVEL_OUTOF10: 0
PAINLEVEL_OUTOF10: 4
PAINLEVEL_OUTOF10: 3
PAINLEVEL_OUTOF10: 0
PAINLEVEL_OUTOF10: 3
PAINLEVEL_OUTOF10: 0
PAINLEVEL_OUTOF10: 1
PAINLEVEL_OUTOF10: 2
PAINLEVEL_OUTOF10: 1
PAINLEVEL_OUTOF10: 0
PAINLEVEL_OUTOF10: 2
PAINLEVEL_OUTOF10: 1
PAINLEVEL_OUTOF10: 4
PAINLEVEL_OUTOF10: 0

## 2018-03-01 NOTE — PROGRESS NOTES
Patient down in angio, not in room. Nursing states no big changes. Continued hallucinations, double vision at distance. Will await results of angio.

## 2018-03-01 NOTE — PROGRESS NOTES
Pt c/o needing to void but on bedrest d/t recent IR procedure - bed pan attempted without success. Pt bladder scanned for 850cc by tech. Per protocol, straight cath'd by RN for 900cc.

## 2018-03-01 NOTE — OR SURGEON
Immediate Post- Operative Note        PostOp Diagnosis: SAH   Procedure(s): Diagnostic cerebral angiogram        Estimated Blood Loss: Less than 5 ml        Complications: None            3/1/2018     9:43 AM     Abdulaziz Cisneros
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
## 2018-03-01 NOTE — PROGRESS NOTES
IR Procedure Note:    Site Marked and Confirmed with MD, patient and RN pre procedure. Dr. Cisneros performed a cerebral angiogram.  The pt tolerated the procedure well; ETCo2 baseline 25, with consistent waveform during the procedure.  Gauze, tegaderm and angioseal applied to right groin, CDI and soft.  Pt alert and verbally appropriate post procedure, vital signs stable during procedure and transport, see flow sheet for vital signs.  Report given to Kobi ESCALANTE.  RN transported pt to S102 with monitor.

## 2018-03-01 NOTE — PROGRESS NOTES
Pt transported on monitor by RN to IR for cerebral carotid angiogram. Report given to IR nurses, who resumed pt care. All questions/concerns addressed.

## 2018-03-01 NOTE — CARE PLAN
Problem: Communication  Goal: The ability to communicate needs accurately and effectively will improve  Outcome: PROGRESSING AS EXPECTED  Pt alert and oriented, able to communicate needs clearly.    Problem: Safety  Goal: Will remain free from falls  Outcome: PROGRESSING AS EXPECTED  Pt remains free from falls at this time - bed in lowest position, bed alarm on, treaded socks and appropriate rails in use, and call bell within reach which pt utilizes appropriately. Pt rounded on hourly to address any needs.

## 2018-03-02 LAB
ANION GAP SERPL CALC-SCNC: 7 MMOL/L (ref 0–11.9)
APTT PPP: 24.3 SEC (ref 24.7–36)
BUN SERPL-MCNC: 8 MG/DL (ref 8–22)
CALCIUM SERPL-MCNC: 8.2 MG/DL (ref 8.5–10.5)
CHLORIDE SERPL-SCNC: 113 MMOL/L (ref 96–112)
CO2 SERPL-SCNC: 24 MMOL/L (ref 20–33)
CREAT SERPL-MCNC: 0.51 MG/DL (ref 0.5–1.4)
ERYTHROCYTE [DISTWIDTH] IN BLOOD BY AUTOMATED COUNT: 48.8 FL (ref 35.9–50)
GLUCOSE SERPL-MCNC: 106 MG/DL (ref 65–99)
HCT VFR BLD AUTO: 37.9 % (ref 37–47)
HGB BLD-MCNC: 12.4 G/DL (ref 12–16)
INR PPP: 1.05 (ref 0.87–1.13)
MCH RBC QN AUTO: 30.5 PG (ref 27–33)
MCHC RBC AUTO-ENTMCNC: 32.7 G/DL (ref 33.6–35)
MCV RBC AUTO: 93.3 FL (ref 81.4–97.8)
PLATELET # BLD AUTO: 156 K/UL (ref 164–446)
PMV BLD AUTO: 10.7 FL (ref 9–12.9)
POTASSIUM SERPL-SCNC: 3.3 MMOL/L (ref 3.6–5.5)
PROTHROMBIN TIME: 13.4 SEC (ref 12–14.6)
RBC # BLD AUTO: 4.06 M/UL (ref 4.2–5.4)
SODIUM SERPL-SCNC: 144 MMOL/L (ref 135–145)
WBC # BLD AUTO: 5.7 K/UL (ref 4.8–10.8)

## 2018-03-02 PROCEDURE — A9270 NON-COVERED ITEM OR SERVICE: HCPCS | Performed by: STUDENT IN AN ORGANIZED HEALTH CARE EDUCATION/TRAINING PROGRAM

## 2018-03-02 PROCEDURE — A9270 NON-COVERED ITEM OR SERVICE: HCPCS | Performed by: INTERNAL MEDICINE

## 2018-03-02 PROCEDURE — 700102 HCHG RX REV CODE 250 W/ 637 OVERRIDE(OP): Performed by: NEUROLOGICAL SURGERY

## 2018-03-02 PROCEDURE — 700102 HCHG RX REV CODE 250 W/ 637 OVERRIDE(OP): Performed by: STUDENT IN AN ORGANIZED HEALTH CARE EDUCATION/TRAINING PROGRAM

## 2018-03-02 PROCEDURE — 700102 HCHG RX REV CODE 250 W/ 637 OVERRIDE(OP): Performed by: INTERNAL MEDICINE

## 2018-03-02 PROCEDURE — 93888 INTRACRANIAL LIMITED STUDY: CPT | Mod: 26 | Performed by: SURGERY

## 2018-03-02 PROCEDURE — 85610 PROTHROMBIN TIME: CPT

## 2018-03-02 PROCEDURE — 700101 HCHG RX REV CODE 250: Performed by: INTERNAL MEDICINE

## 2018-03-02 PROCEDURE — 700112 HCHG RX REV CODE 229: Performed by: NEUROLOGICAL SURGERY

## 2018-03-02 PROCEDURE — A9270 NON-COVERED ITEM OR SERVICE: HCPCS | Performed by: NEUROLOGICAL SURGERY

## 2018-03-02 PROCEDURE — 80048 BASIC METABOLIC PNL TOTAL CA: CPT

## 2018-03-02 PROCEDURE — 770022 HCHG ROOM/CARE - ICU (200)

## 2018-03-02 PROCEDURE — 93888 INTRACRANIAL LIMITED STUDY: CPT

## 2018-03-02 PROCEDURE — 85027 COMPLETE CBC AUTOMATED: CPT

## 2018-03-02 PROCEDURE — 85730 THROMBOPLASTIN TIME PARTIAL: CPT

## 2018-03-02 RX ORDER — CLONIDINE HYDROCHLORIDE 0.1 MG/1
0.1 TABLET ORAL EVERY 4 HOURS PRN
Status: DISCONTINUED | OUTPATIENT
Start: 2018-03-02 | End: 2018-03-12

## 2018-03-02 RX ORDER — POTASSIUM CHLORIDE 20 MEQ/1
40 TABLET, EXTENDED RELEASE ORAL ONCE
Status: COMPLETED | OUTPATIENT
Start: 2018-03-02 | End: 2018-03-02

## 2018-03-02 RX ORDER — LEVETIRACETAM 500 MG/1
500 TABLET ORAL 2 TIMES DAILY
Status: DISCONTINUED | OUTPATIENT
Start: 2018-03-02 | End: 2018-03-07

## 2018-03-02 RX ORDER — HYDRALAZINE HYDROCHLORIDE 20 MG/ML
10-20 INJECTION INTRAMUSCULAR; INTRAVENOUS EVERY 4 HOURS PRN
Status: DISCONTINUED | OUTPATIENT
Start: 2018-03-02 | End: 2018-03-12

## 2018-03-02 RX ADMIN — SIMVASTATIN 20 MG: 20 TABLET, FILM COATED ORAL at 21:14

## 2018-03-02 RX ADMIN — NIMODIPINE 60 MG: 30 CAPSULE ORAL at 05:57

## 2018-03-02 RX ADMIN — STANDARDIZED SENNA CONCENTRATE AND DOCUSATE SODIUM 2 TABLET: 8.6; 5 TABLET, FILM COATED ORAL at 07:32

## 2018-03-02 RX ADMIN — LEVETIRACETAM 500 MG: 500 TABLET, FILM COATED ORAL at 09:58

## 2018-03-02 RX ADMIN — POTASSIUM CHLORIDE AND SODIUM CHLORIDE: 900; 300 INJECTION, SOLUTION INTRAVENOUS at 02:44

## 2018-03-02 RX ADMIN — NIMODIPINE 60 MG: 30 CAPSULE ORAL at 02:32

## 2018-03-02 RX ADMIN — NIMODIPINE 60 MG: 30 CAPSULE ORAL at 09:58

## 2018-03-02 RX ADMIN — LISINOPRIL 20 MG: 20 TABLET ORAL at 07:32

## 2018-03-02 RX ADMIN — DOCUSATE SODIUM 100 MG: 100 CAPSULE ORAL at 07:32

## 2018-03-02 RX ADMIN — NIMODIPINE 60 MG: 30 CAPSULE ORAL at 18:02

## 2018-03-02 RX ADMIN — POTASSIUM CHLORIDE 40 MEQ: 1500 TABLET, EXTENDED RELEASE ORAL at 07:32

## 2018-03-02 RX ADMIN — ACETAMINOPHEN 650 MG: 325 TABLET, FILM COATED ORAL at 05:57

## 2018-03-02 RX ADMIN — NIMODIPINE 60 MG: 30 CAPSULE ORAL at 14:19

## 2018-03-02 RX ADMIN — POTASSIUM CHLORIDE AND SODIUM CHLORIDE: 900; 300 INJECTION, SOLUTION INTRAVENOUS at 21:16

## 2018-03-02 RX ADMIN — OXYCODONE HYDROCHLORIDE 5 MG: 5 TABLET ORAL at 07:32

## 2018-03-02 RX ADMIN — DOCUSATE SODIUM 100 MG: 100 CAPSULE ORAL at 21:14

## 2018-03-02 RX ADMIN — LEVETIRACETAM 500 MG: 500 TABLET, FILM COATED ORAL at 21:14

## 2018-03-02 RX ADMIN — NIMODIPINE 60 MG: 30 CAPSULE ORAL at 21:13

## 2018-03-02 RX ADMIN — STANDARDIZED SENNA CONCENTRATE AND DOCUSATE SODIUM 2 TABLET: 8.6; 5 TABLET, FILM COATED ORAL at 21:13

## 2018-03-02 ASSESSMENT — ENCOUNTER SYMPTOMS
CHILLS: 0
VOMITING: 0
SENSORY CHANGE: 0
FEVER: 0
SPEECH CHANGE: 0
TINGLING: 0
LOSS OF CONSCIOUSNESS: 0
PALPITATIONS: 0
WHEEZING: 0
DIZZINESS: 0
ABDOMINAL PAIN: 0
HEADACHES: 1
WEAKNESS: 0
COUGH: 0
SHORTNESS OF BREATH: 0
BLURRED VISION: 0
PHOTOPHOBIA: 0
CONSTIPATION: 0
EYE PAIN: 0
NAUSEA: 0
TREMORS: 0

## 2018-03-02 ASSESSMENT — PAIN SCALES - GENERAL
PAINLEVEL_OUTOF10: 0
PAINLEVEL_OUTOF10: 3
PAINLEVEL_OUTOF10: 0
PAINLEVEL_OUTOF10: 1
PAINLEVEL_OUTOF10: 0
PAINLEVEL_OUTOF10: 6
PAINLEVEL_OUTOF10: 6
PAINLEVEL_OUTOF10: 0
PAINLEVEL_OUTOF10: 0
PAINLEVEL_OUTOF10: 8
PAINLEVEL_OUTOF10: 0
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
## 2018-03-02 NOTE — CARE PLAN
Problem: Nutritional:  Goal: Achieve adequate nutritional intake  Diet to advance and patient will consume >50% of meals   Outcome: MET Date Met: 03/02/18  Patient ate % in the last several meals.

## 2018-03-02 NOTE — PROGRESS NOTES
Neurosurgery Progress Note    Subjective:  Mild headache  No auditory or visual hallucinations    Exam:  Oriented to self and place, month/year  EOMI  Pupils 3 mm, reactive  No drift, FCx4  Sensation normal in all 4 extremities    BP  Min: 114/58  Max: 127/57  Pulse  Av.6  Min: 60  Max: 75  Resp  Av.4  Min: 15  Max: 61  Temp  Av.7 °C (98 °F)  Min: 36.3 °C (97.3 °F)  Max: 37 °C (98.6 °F)  SpO2  Av.5 %  Min: 89 %  Max: 98 %    No Data Recorded    Recent Labs      180  18   0435   WBC  6.7  6.2  5.7   RBC  4.15*  4.20  4.06*   HEMOGLOBIN  12.4  12.7  12.4   HEMATOCRIT  39.1  39.3  37.9   MCV  94.2  93.6  93.3   MCH  29.9  30.2  30.5   MCHC  31.7*  32.3*  32.7*   RDW  49.7  48.0  48.8   PLATELETCT  176  165  156*   MPV  11.0  11.2  10.7     Recent Labs      18   0450  18   0435   SODIUM  144  142  144   POTASSIUM  3.6  3.4*  3.3*   CHLORIDE  111  110  113*   CO2  20  24  24   GLUCOSE  75  105*  106*   BUN  23*  17  8   CPKTOTAL  136   --    --      Recent Labs      180  18   0435   APTT  25.2  23.5*  24.3*   INR  1.22*  1.04  1.05     Recent Labs      18   0450   REACTMIN  3.2*  3.3*   CLOTKINET  1.2  1.2   CLOTANGL  72.8*  73.5*   MAXCLOTS  65.5  65.4   [id number]  3.0  0.0   PRCINADP  5.5  0.0   PRCINAA  52.5  33.3       Intake/Output       18 07 - 18 0659 18 07 - 18 0659      4451-0171 0468-3761 Total 3058-7583 3969-0888 Total       Intake    P.O.  240  575 815  --  -- --    P.O. 240 575 815 -- -- --    I.V.  975  900 1875  --  -- --    IV Piggyback Volume (IV Piggyback) 50 -- 50 -- -- --    IV Volume (NS)  -- -- --    Total Intake 1215 1475 2690 -- -- --       Output    Urine    1225 3175  600  -- 600    Number of Times Voided 2 x 3 x 5 x 1 x -- 1 x    Straight Catheter 1950 -- -- --    Void (ml) -- 1225 1225 600 -- 600     Stool  --  -- --  --  -- --    Number of Times Stooled 0 x -- 0 x -- -- --    Total Output 1950 1225 3175 600 -- 600       Net I/O     -735 250 -485 -600 -- -600            Intake/Output Summary (Last 24 hours) at 03/02/18 0827  Last data filed at 03/02/18 0700   Gross per 24 hour   Intake             2490 ml   Output             3775 ml   Net            -1285 ml       Bladder Scan Results (ml): 850    • 0.9 % NaCl with KCl 40 mEq 1,000 mL   Continuous   • hydrALAZINE  10-20 mg Q4HRS PRN   • senna-docusate  2 Tab BID    And   • magnesium hydroxide  30 mL QDAY PRN    And   • bisacodyl  10 mg QDAY PRN   • acetaminophen  650 mg Q6HRS PRN   • lisinopril  20 mg DAILY   • niMODipine  60 mg Q4HRS   • Pharmacy Consult Request  1 Each PRN   • oxyCODONE immediate-release  5 mg Q3HRS PRN   • oxyCODONE immediate release  10 mg Q3HRS PRN   • MD ALERT...Do not administer NSAIDS or ASPIRIN unless ORDERED By Neurosurgery  1 Each PRN   • ondansetron  4 mg Q4HRS PRN   • dexamethasone  4 mg Once PRN   • diphenhydrAMINE  25 mg Q6HRS PRN   • scopolamine  1 Patch Q72HRS PRN   • labetalol  10 mg Q10 MIN PRN   • hydrALAZINE  10 mg Q10 MIN PRN   • cloNIDine  0.1 mg Q4HRS PRN   • docusate sodium  100 mg BID   • polyethylene glycol/lytes  1 Packet BID PRN   • HYDROmorphone  0.5 mg Q3HRS PRN   • levETIRAcetam  500 mg Q12HRS   • simvastatin  20 mg Q EVENING     Imaging:  CTA shows left MCA occlusion with distal left MCA aneurysm  Angiogram confirms chronic left M1 segment occlusion with development of collaterals, but no definitive aneurysm    Assessment and Plan:  Hospital day #3 SAH/aneurysm; post-SAH day #3  Prophylactic anticoagulation: no         Start date/time: tbd    Moyamoya type pattern of cerebrovascular disease  Treat SAH as ruptured; Nimodipine; SBP <150  No definitive treatment to offer for hemorrhage at this time

## 2018-03-02 NOTE — CARE PLAN
Problem: Safety  Goal: Will remain free from injury  Fall prevention and safety protocols in place    Problem: Venous Thromboembolism (VTW)/Deep Vein Thrombosis (DVT) Prevention:  Goal: Patient will participate in Venous Thrombosis (VTE)/Deep Vein Thrombosis (DVT)Prevention Measures   02/28/18 0220 03/01/18 2000   Mechanical/VTE Prophylaxis   Mechanical Prophylaxis  --  SCDs, Sequential Compression Device   AV Foot Pumps Off --    LARRY Hose (Graduated Compression Stockings) Off --    SCDs, Sequential Compression Device --  On   OTHER   Risk Assessment Score --  6   VTE RISK --  Very High   Pharmacologic Prophylaxis Used --  Contraindicated per MD       Problem: Respiratory:  Goal: Respiratory status will improve  Pulm toilet
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
## 2018-03-03 LAB
ANION GAP SERPL CALC-SCNC: 4 MMOL/L (ref 0–11.9)
BASOPHILS # BLD AUTO: 0.6 % (ref 0–1.8)
BASOPHILS # BLD: 0.04 K/UL (ref 0–0.12)
BUN SERPL-MCNC: 9 MG/DL (ref 8–22)
CALCIUM SERPL-MCNC: 8.4 MG/DL (ref 8.5–10.5)
CHLORIDE SERPL-SCNC: 113 MMOL/L (ref 96–112)
CO2 SERPL-SCNC: 24 MMOL/L (ref 20–33)
CREAT SERPL-MCNC: 0.49 MG/DL (ref 0.5–1.4)
EOSINOPHIL # BLD AUTO: 0.17 K/UL (ref 0–0.51)
EOSINOPHIL NFR BLD: 2.6 % (ref 0–6.9)
ERYTHROCYTE [DISTWIDTH] IN BLOOD BY AUTOMATED COUNT: 49.9 FL (ref 35.9–50)
GLUCOSE SERPL-MCNC: 103 MG/DL (ref 65–99)
HCT VFR BLD AUTO: 37.7 % (ref 37–47)
HGB BLD-MCNC: 12.1 G/DL (ref 12–16)
IMM GRANULOCYTES # BLD AUTO: 0.01 K/UL (ref 0–0.11)
IMM GRANULOCYTES NFR BLD AUTO: 0.2 % (ref 0–0.9)
LYMPHOCYTES # BLD AUTO: 2.3 K/UL (ref 1–4.8)
LYMPHOCYTES NFR BLD: 35.5 % (ref 22–41)
MAGNESIUM SERPL-MCNC: 1.7 MG/DL (ref 1.5–2.5)
MCH RBC QN AUTO: 30.3 PG (ref 27–33)
MCHC RBC AUTO-ENTMCNC: 32.1 G/DL (ref 33.6–35)
MCV RBC AUTO: 94.3 FL (ref 81.4–97.8)
MONOCYTES # BLD AUTO: 0.68 K/UL (ref 0–0.85)
MONOCYTES NFR BLD AUTO: 10.5 % (ref 0–13.4)
NEUTROPHILS # BLD AUTO: 3.28 K/UL (ref 2–7.15)
NEUTROPHILS NFR BLD: 50.6 % (ref 44–72)
NRBC # BLD AUTO: 0 K/UL
NRBC BLD-RTO: 0 /100 WBC
PLATELET # BLD AUTO: 159 K/UL (ref 164–446)
PMV BLD AUTO: 11.4 FL (ref 9–12.9)
POTASSIUM SERPL-SCNC: 3.9 MMOL/L (ref 3.6–5.5)
RBC # BLD AUTO: 4 M/UL (ref 4.2–5.4)
SODIUM SERPL-SCNC: 141 MMOL/L (ref 135–145)
WBC # BLD AUTO: 6.5 K/UL (ref 4.8–10.8)

## 2018-03-03 PROCEDURE — A9270 NON-COVERED ITEM OR SERVICE: HCPCS | Performed by: INTERNAL MEDICINE

## 2018-03-03 PROCEDURE — 83735 ASSAY OF MAGNESIUM: CPT

## 2018-03-03 PROCEDURE — 700102 HCHG RX REV CODE 250 W/ 637 OVERRIDE(OP): Performed by: STUDENT IN AN ORGANIZED HEALTH CARE EDUCATION/TRAINING PROGRAM

## 2018-03-03 PROCEDURE — 700102 HCHG RX REV CODE 250 W/ 637 OVERRIDE(OP): Performed by: INTERNAL MEDICINE

## 2018-03-03 PROCEDURE — 80048 BASIC METABOLIC PNL TOTAL CA: CPT

## 2018-03-03 PROCEDURE — 770022 HCHG ROOM/CARE - ICU (200)

## 2018-03-03 PROCEDURE — A9270 NON-COVERED ITEM OR SERVICE: HCPCS | Performed by: STUDENT IN AN ORGANIZED HEALTH CARE EDUCATION/TRAINING PROGRAM

## 2018-03-03 PROCEDURE — 93888 INTRACRANIAL LIMITED STUDY: CPT | Mod: 26 | Performed by: SURGERY

## 2018-03-03 PROCEDURE — 700111 HCHG RX REV CODE 636 W/ 250 OVERRIDE (IP): Performed by: INTERNAL MEDICINE

## 2018-03-03 PROCEDURE — 85025 COMPLETE CBC W/AUTO DIFF WBC: CPT

## 2018-03-03 PROCEDURE — 93888 INTRACRANIAL LIMITED STUDY: CPT

## 2018-03-03 RX ORDER — NIMODIPINE 30 MG/1
60 CAPSULE, LIQUID FILLED ORAL EVERY 4 HOURS
Qty: 18 CAP | Refills: 0 | Status: SHIPPED | OUTPATIENT
Start: 2018-03-03 | End: 2018-03-03

## 2018-03-03 RX ORDER — HALOPERIDOL 5 MG/ML
5 INJECTION INTRAMUSCULAR ONCE
Status: COMPLETED | OUTPATIENT
Start: 2018-03-03 | End: 2018-03-03

## 2018-03-03 RX ORDER — POTASSIUM CHLORIDE 20 MEQ/1
40 TABLET, EXTENDED RELEASE ORAL 2 TIMES DAILY
Status: DISCONTINUED | OUTPATIENT
Start: 2018-03-03 | End: 2018-03-04

## 2018-03-03 RX ORDER — MIDAZOLAM HYDROCHLORIDE 1 MG/ML
2 INJECTION INTRAMUSCULAR; INTRAVENOUS ONCE
Status: COMPLETED | OUTPATIENT
Start: 2018-03-03 | End: 2018-03-03

## 2018-03-03 RX ORDER — NIMODIPINE 30 MG/1
60 CAPSULE, LIQUID FILLED ORAL EVERY 4 HOURS
Qty: 18 CAP | Refills: 0 | Status: SHIPPED | OUTPATIENT
Start: 2018-03-03 | End: 2018-03-13

## 2018-03-03 RX ORDER — DIPHENHYDRAMINE HYDROCHLORIDE 50 MG/ML
50 INJECTION INTRAMUSCULAR; INTRAVENOUS ONCE
Status: COMPLETED | OUTPATIENT
Start: 2018-03-03 | End: 2018-03-03

## 2018-03-03 RX ORDER — LEVETIRACETAM 500 MG/1
500 TABLET ORAL 2 TIMES DAILY
Qty: 60 TAB | Refills: 0 | Status: SHIPPED | OUTPATIENT
Start: 2018-03-03 | End: 2018-03-13

## 2018-03-03 RX ORDER — LEVETIRACETAM 500 MG/1
500 TABLET ORAL 2 TIMES DAILY
Qty: 60 TAB | Refills: 0 | Status: SHIPPED | OUTPATIENT
Start: 2018-03-03 | End: 2018-03-03

## 2018-03-03 RX ORDER — MAGNESIUM SULFATE HEPTAHYDRATE 40 MG/ML
2 INJECTION, SOLUTION INTRAVENOUS ONCE
Status: DISPENSED | OUTPATIENT
Start: 2018-03-03 | End: 2018-03-04

## 2018-03-03 RX ADMIN — NIMODIPINE 60 MG: 30 CAPSULE ORAL at 14:15

## 2018-03-03 RX ADMIN — SIMVASTATIN 20 MG: 20 TABLET, FILM COATED ORAL at 20:24

## 2018-03-03 RX ADMIN — DIPHENHYDRAMINE HYDROCHLORIDE 50 MG: 50 INJECTION, SOLUTION INTRAMUSCULAR; INTRAVENOUS at 16:30

## 2018-03-03 RX ADMIN — ACETAMINOPHEN 650 MG: 325 TABLET, FILM COATED ORAL at 12:28

## 2018-03-03 RX ADMIN — NIMODIPINE 60 MG: 30 CAPSULE ORAL at 06:15

## 2018-03-03 RX ADMIN — NIMODIPINE 60 MG: 30 CAPSULE ORAL at 02:35

## 2018-03-03 RX ADMIN — LEVETIRACETAM 500 MG: 500 TABLET, FILM COATED ORAL at 20:24

## 2018-03-03 RX ADMIN — NIMODIPINE 60 MG: 30 CAPSULE ORAL at 23:05

## 2018-03-03 RX ADMIN — NIMODIPINE 60 MG: 30 CAPSULE ORAL at 09:56

## 2018-03-03 RX ADMIN — POTASSIUM CHLORIDE 40 MEQ: 1500 TABLET, EXTENDED RELEASE ORAL at 20:24

## 2018-03-03 RX ADMIN — LEVETIRACETAM 500 MG: 500 TABLET, FILM COATED ORAL at 08:24

## 2018-03-03 RX ADMIN — HALOPERIDOL LACTATE 5 MG: 5 INJECTION, SOLUTION INTRAMUSCULAR at 16:30

## 2018-03-03 RX ADMIN — LISINOPRIL 20 MG: 20 TABLET ORAL at 08:24

## 2018-03-03 RX ADMIN — MIDAZOLAM HYDROCHLORIDE 2 MG: 1 INJECTION, SOLUTION INTRAMUSCULAR; INTRAVENOUS at 16:30

## 2018-03-03 RX ADMIN — NIMODIPINE 60 MG: 30 CAPSULE ORAL at 18:11

## 2018-03-03 ASSESSMENT — ENCOUNTER SYMPTOMS
TREMORS: 0
NAUSEA: 0
PHOTOPHOBIA: 0
PALPITATIONS: 0
DIZZINESS: 0
HEADACHES: 1
SPEECH CHANGE: 0
WEAKNESS: 0
ABDOMINAL PAIN: 0
LOSS OF CONSCIOUSNESS: 0
CHILLS: 0
BLURRED VISION: 0
EYE PAIN: 0
SENSORY CHANGE: 0
CONSTIPATION: 0
COUGH: 0
VOMITING: 0
SHORTNESS OF BREATH: 0
FEVER: 0
TINGLING: 0
WHEEZING: 0

## 2018-03-03 ASSESSMENT — PATIENT HEALTH QUESTIONNAIRE - PHQ9
2. FEELING DOWN, DEPRESSED, IRRITABLE, OR HOPELESS: SEVERAL DAYS
8. MOVING OR SPEAKING SO SLOWLY THAT OTHER PEOPLE COULD HAVE NOTICED. OR THE OPPOSITE, BEING SO FIGETY OR RESTLESS THAT YOU HAVE BEEN MOVING AROUND A LOT MORE THAN USUAL: NOT AT ALL
3. TROUBLE FALLING OR STAYING ASLEEP OR SLEEPING TOO MUCH: SEVERAL DAYS
4. FEELING TIRED OR HAVING LITTLE ENERGY: SEVERAL DAYS
5. POOR APPETITE OR OVEREATING: SEVERAL DAYS
SUM OF ALL RESPONSES TO PHQ9 QUESTIONS 1 AND 2: 2
9. THOUGHTS THAT YOU WOULD BE BETTER OFF DEAD, OR OF HURTING YOURSELF: NOT AT ALL
6. FEELING BAD ABOUT YOURSELF - OR THAT YOU ARE A FAILURE OR HAVE LET YOURSELF OR YOUR FAMILY DOWN: NOT AL ALL
7. TROUBLE CONCENTRATING ON THINGS, SUCH AS READING THE NEWSPAPER OR WATCHING TELEVISION: NOT AT ALL
SUM OF ALL RESPONSES TO PHQ QUESTIONS 1-9: 5
1. LITTLE INTEREST OR PLEASURE IN DOING THINGS: SEVERAL DAYS

## 2018-03-03 ASSESSMENT — PAIN SCALES - GENERAL
PAINLEVEL_OUTOF10: 0
PAINLEVEL_OUTOF10: 0
PAINLEVEL_OUTOF10: 3
PAINLEVEL_OUTOF10: 0

## 2018-03-03 NOTE — PROGRESS NOTES
Event Note Neurosurgery Progress Note    Subjective:  Mild headache  No auditory or visual hallucinations  Denies double vision  Wants to go home today, RN states she is also very paranoid    Exam:  AAOx3  EOMI  Pupils 3 mm, reactive  No drift, FCx4  Sensation normal in all 4 extremities    BP  Min: 128/62  Max: 135/63  Pulse  Av.5  Min: 58  Max: 82  Resp  Av  Min: 12  Max: 85  Temp  Av.7 °C (98 °F)  Min: 36.3 °C (97.4 °F)  Max: 37.3 °C (99.1 °F)  SpO2  Av.4 %  Min: 90 %  Max: 100 %    No Data Recorded    Recent Labs      18   0450  18   0435  18   0530   WBC  6.2  5.7  6.5   RBC  4.20  4.06*  4.00*   HEMOGLOBIN  12.7  12.4  12.1   HEMATOCRIT  39.3  37.9  37.7   MCV  93.6  93.3  94.3   MCH  30.2  30.5  30.3   MCHC  32.3*  32.7*  32.1*   RDW  48.0  48.8  49.9   PLATELETCT  165  156*  159*   MPV  11.2  10.7  11.4     Recent Labs      18   0450  18   0435  18   0530   SODIUM  142  144  141   POTASSIUM  3.4*  3.3*  3.9   CHLORIDE  110  113*  113*   CO2  24  24  24   GLUCOSE  105*  106*  103*   BUN  17  8  9     Recent Labs      18   0450  18   0435   APTT  23.5*  24.3*   INR  1.04  1.05     Recent Labs      18   0450   REACTMIN  3.3*   CLOTKINET  1.2   CLOTANGL  73.5*   MAXCLOTS  65.4   [id number]  0.0   PRCINADP  0.0   PRCINAA  33.3       Intake/Output       18 - 1859 18 - 1859      1900-0659 Total 7378-9593 1239-6278 Total       Intake    P.O.  1920  290 2210  100  -- 100    P.O. 2043 594 2401 100 -- 100    I.V.  900  900 1800  150  -- 150    IV Volume (NS)  150 -- 150    Total Intake 2820 1190 4010 250 -- 250       Output    Urine  1025   290  500  -- 500    Number of Times Voided 1 x 4 x 5 x 1 x -- 1 x    Void (ml) 1025  290 500 -- 500    Stool  --  -- --  --  -- --    Number of Times Stooled -- -- -- 0 x -- 0 x    Total Output 1025 187 290 500 -- 500       Net I/O     1600 -228 9284  -250 -- -250            Intake/Output Summary (Last 24 hours) at 03/03/18 0941  Last data filed at 03/03/18 0800   Gross per 24 hour   Intake             3750 ml   Output             2800 ml   Net              950 ml            • magnesium sulfate  2 g Once   • potassium chloride SA  40 mEq BID   • levETIRAcetam  500 mg BID   • cloNIDine  0.1 mg Q4HRS PRN   • hydrALAZINE  10-20 mg Q4HRS PRN   • 0.9 % NaCl with KCl 40 mEq 1,000 mL   Continuous   • senna-docusate  2 Tab BID    And   • magnesium hydroxide  30 mL QDAY PRN    And   • bisacodyl  10 mg QDAY PRN   • acetaminophen  650 mg Q6HRS PRN   • lisinopril  20 mg DAILY   • niMODipine  60 mg Q4HRS   • Pharmacy Consult Request  1 Each PRN   • oxyCODONE immediate-release  5 mg Q3HRS PRN   • oxyCODONE immediate release  10 mg Q3HRS PRN   • MD ALERT...Do not administer NSAIDS or ASPIRIN unless ORDERED By Neurosurgery  1 Each PRN   • ondansetron  4 mg Q4HRS PRN   • dexamethasone  4 mg Once PRN   • diphenhydrAMINE  25 mg Q6HRS PRN   • scopolamine  1 Patch Q72HRS PRN   • labetalol  10 mg Q10 MIN PRN   • hydrALAZINE  10 mg Q10 MIN PRN   • docusate sodium  100 mg BID   • polyethylene glycol/lytes  1 Packet BID PRN   • HYDROmorphone  0.5 mg Q3HRS PRN   • simvastatin  20 mg Q EVENING     Imaging:  CTA shows left MCA occlusion with distal left MCA aneurysm  Angiogram confirms chronic left M1 segment occlusion with development of collaterals, but no definitive aneurysm    Assessment and Plan:  Hospital day #4 SAH/aneurysm; post-SAH day #4  Prophylactic anticoagulation: no         Start date/time: tbd    Moyamoya type pattern of cerebrovascular disease  Treat SAH as ruptured; Nimodipine; SBP <150  No definitive treatment to offer for hemorrhage at this time  Keep on Keppra over weekend, Dr. Noguera to address Monday  Keep in ICU today
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
## 2018-03-03 NOTE — CARE PLAN
Problem: Skin Integrity  Goal: Risk for impaired skin integrity will decrease    Intervention: Assess risk factors for impaired skin integrity and/or pressure ulcers  2 RN skin check done at shift change.  No pressures have found.      Problem: Pain Management  Goal: Pain level will decrease to patient's comfort goal    Intervention: Follow pain managment plan developed in collaboration with patient and Interdisciplinary Team  Pt denies having any pain at this moment
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
## 2018-03-03 NOTE — ASSESSMENT & PLAN NOTE
-CTA showed the size of the aneurysm in the left anterior temporal fossa to be 4 mm.   -s/p Angiogram on 3/1, no definitive aneurysm noted.  -As per neurosurgery- to repeat angiogram prior to discharge to ensure no aneurysm is missed    - angiogram done on 3/12/18 - no complications and Neurosurgery agree with discharge and no surgery indicated at this time

## 2018-03-04 ENCOUNTER — APPOINTMENT (OUTPATIENT)
Dept: RADIOLOGY | Facility: MEDICAL CENTER | Age: 73
DRG: 064 | End: 2018-03-04
Attending: INTERNAL MEDICINE
Payer: MEDICARE

## 2018-03-04 LAB
ANION GAP SERPL CALC-SCNC: 7 MMOL/L (ref 0–11.9)
APPEARANCE UR: CLEAR
BACTERIA #/AREA URNS HPF: NEGATIVE /HPF
BASOPHILS # BLD AUTO: 0.3 % (ref 0–1.8)
BASOPHILS # BLD: 0.02 K/UL (ref 0–0.12)
BILIRUB UR QL STRIP.AUTO: NEGATIVE
BUN SERPL-MCNC: 10 MG/DL (ref 8–22)
CALCIUM SERPL-MCNC: 9.3 MG/DL (ref 8.5–10.5)
CHLORIDE SERPL-SCNC: 106 MMOL/L (ref 96–112)
CO2 SERPL-SCNC: 25 MMOL/L (ref 20–33)
COLOR UR: YELLOW
CREAT SERPL-MCNC: 0.63 MG/DL (ref 0.5–1.4)
EOSINOPHIL # BLD AUTO: 0.11 K/UL (ref 0–0.51)
EOSINOPHIL NFR BLD: 1.7 % (ref 0–6.9)
EPI CELLS #/AREA URNS HPF: ABNORMAL /HPF
ERYTHROCYTE [DISTWIDTH] IN BLOOD BY AUTOMATED COUNT: 49.6 FL (ref 35.9–50)
GLUCOSE SERPL-MCNC: 146 MG/DL (ref 65–99)
GLUCOSE UR STRIP.AUTO-MCNC: NEGATIVE MG/DL
HCT VFR BLD AUTO: 40.4 % (ref 37–47)
HGB BLD-MCNC: 13.3 G/DL (ref 12–16)
HYALINE CASTS #/AREA URNS LPF: ABNORMAL /LPF
IMM GRANULOCYTES # BLD AUTO: 0.01 K/UL (ref 0–0.11)
IMM GRANULOCYTES NFR BLD AUTO: 0.2 % (ref 0–0.9)
KETONES UR STRIP.AUTO-MCNC: NEGATIVE MG/DL
LEUKOCYTE ESTERASE UR QL STRIP.AUTO: NEGATIVE
LYMPHOCYTES # BLD AUTO: 1.92 K/UL (ref 1–4.8)
LYMPHOCYTES NFR BLD: 29.6 % (ref 22–41)
MAGNESIUM SERPL-MCNC: 1.8 MG/DL (ref 1.5–2.5)
MCH RBC QN AUTO: 30.8 PG (ref 27–33)
MCHC RBC AUTO-ENTMCNC: 32.9 G/DL (ref 33.6–35)
MCV RBC AUTO: 93.5 FL (ref 81.4–97.8)
MICRO URNS: ABNORMAL
MONOCYTES # BLD AUTO: 0.39 K/UL (ref 0–0.85)
MONOCYTES NFR BLD AUTO: 6 % (ref 0–13.4)
NEUTROPHILS # BLD AUTO: 4.03 K/UL (ref 2–7.15)
NEUTROPHILS NFR BLD: 62.2 % (ref 44–72)
NITRITE UR QL STRIP.AUTO: NEGATIVE
NRBC # BLD AUTO: 0 K/UL
NRBC BLD-RTO: 0 /100 WBC
PH UR STRIP.AUTO: 7.5 [PH]
PLATELET # BLD AUTO: 179 K/UL (ref 164–446)
PMV BLD AUTO: 11.1 FL (ref 9–12.9)
POTASSIUM SERPL-SCNC: 4.9 MMOL/L (ref 3.6–5.5)
PROT UR QL STRIP: NEGATIVE MG/DL
RBC # BLD AUTO: 4.32 M/UL (ref 4.2–5.4)
RBC # URNS HPF: ABNORMAL /HPF
RBC UR QL AUTO: ABNORMAL
SODIUM SERPL-SCNC: 138 MMOL/L (ref 135–145)
SP GR UR STRIP.AUTO: 1.01
UROBILINOGEN UR STRIP.AUTO-MCNC: 0.2 MG/DL
WBC # BLD AUTO: 6.5 K/UL (ref 4.8–10.8)
WBC #/AREA URNS HPF: ABNORMAL /HPF

## 2018-03-04 PROCEDURE — A9270 NON-COVERED ITEM OR SERVICE: HCPCS | Performed by: INTERNAL MEDICINE

## 2018-03-04 PROCEDURE — 83735 ASSAY OF MAGNESIUM: CPT

## 2018-03-04 PROCEDURE — 93886 INTRACRANIAL COMPLETE STUDY: CPT | Mod: 26 | Performed by: SURGERY

## 2018-03-04 PROCEDURE — 70450 CT HEAD/BRAIN W/O DYE: CPT

## 2018-03-04 PROCEDURE — 700102 HCHG RX REV CODE 250 W/ 637 OVERRIDE(OP): Performed by: INTERNAL MEDICINE

## 2018-03-04 PROCEDURE — 700102 HCHG RX REV CODE 250 W/ 637 OVERRIDE(OP): Performed by: STUDENT IN AN ORGANIZED HEALTH CARE EDUCATION/TRAINING PROGRAM

## 2018-03-04 PROCEDURE — 80048 BASIC METABOLIC PNL TOTAL CA: CPT

## 2018-03-04 PROCEDURE — 93888 INTRACRANIAL LIMITED STUDY: CPT

## 2018-03-04 PROCEDURE — A9270 NON-COVERED ITEM OR SERVICE: HCPCS | Performed by: STUDENT IN AN ORGANIZED HEALTH CARE EDUCATION/TRAINING PROGRAM

## 2018-03-04 PROCEDURE — 700111 HCHG RX REV CODE 636 W/ 250 OVERRIDE (IP): Performed by: STUDENT IN AN ORGANIZED HEALTH CARE EDUCATION/TRAINING PROGRAM

## 2018-03-04 PROCEDURE — 85025 COMPLETE CBC W/AUTO DIFF WBC: CPT

## 2018-03-04 PROCEDURE — 81001 URINALYSIS AUTO W/SCOPE: CPT

## 2018-03-04 PROCEDURE — 770022 HCHG ROOM/CARE - ICU (200)

## 2018-03-04 RX ORDER — MAGNESIUM SULFATE HEPTAHYDRATE 40 MG/ML
2 INJECTION, SOLUTION INTRAVENOUS ONCE
Status: COMPLETED | OUTPATIENT
Start: 2018-03-04 | End: 2018-03-04

## 2018-03-04 RX ADMIN — NIMODIPINE 60 MG: 30 CAPSULE ORAL at 13:00

## 2018-03-04 RX ADMIN — NIMODIPINE 60 MG: 30 CAPSULE ORAL at 16:54

## 2018-03-04 RX ADMIN — MAGNESIUM SULFATE IN WATER 2 G: 40 INJECTION, SOLUTION INTRAVENOUS at 08:39

## 2018-03-04 RX ADMIN — LEVETIRACETAM 500 MG: 500 TABLET, FILM COATED ORAL at 08:39

## 2018-03-04 RX ADMIN — POTASSIUM CHLORIDE 40 MEQ: 1500 TABLET, EXTENDED RELEASE ORAL at 08:39

## 2018-03-04 RX ADMIN — ACETAMINOPHEN 650 MG: 325 TABLET, FILM COATED ORAL at 16:53

## 2018-03-04 RX ADMIN — NIMODIPINE 60 MG: 30 CAPSULE ORAL at 21:00

## 2018-03-04 RX ADMIN — LEVETIRACETAM 500 MG: 500 TABLET, FILM COATED ORAL at 21:00

## 2018-03-04 RX ADMIN — LISINOPRIL 20 MG: 20 TABLET ORAL at 08:39

## 2018-03-04 RX ADMIN — SIMVASTATIN 20 MG: 20 TABLET, FILM COATED ORAL at 21:00

## 2018-03-04 RX ADMIN — NIMODIPINE 60 MG: 30 CAPSULE ORAL at 08:01

## 2018-03-04 ASSESSMENT — ENCOUNTER SYMPTOMS
ABDOMINAL PAIN: 0
DIZZINESS: 0
SHORTNESS OF BREATH: 0
VOMITING: 0
BLURRED VISION: 0
HEADACHES: 1
TINGLING: 0
PALPITATIONS: 0
CHILLS: 0
FEVER: 0
WHEEZING: 0
EYE PAIN: 0
WEAKNESS: 0
PHOTOPHOBIA: 0
COUGH: 0
TREMORS: 0
CONSTIPATION: 0
NAUSEA: 0
SENSORY CHANGE: 0
LOSS OF CONSCIOUSNESS: 0
SPEECH CHANGE: 0

## 2018-03-04 ASSESSMENT — PAIN SCALES - GENERAL
PAINLEVEL_OUTOF10: 0
PAINLEVEL_OUTOF10: 1
PAINLEVEL_OUTOF10: 0
PAINLEVEL_OUTOF10: 1
PAINLEVEL_OUTOF10: 0

## 2018-03-04 NOTE — CARE PLAN
Problem: Safety  Goal: Will remain free from falls  Pt educated on fall risks, call light in reach, close ICU monitoring, bed alarm on.    Problem: Skin Integrity  Goal: Risk for impaired skin integrity will decrease  Skin checks q shift, pt turns self.  Intervention: Assess risk factors for impaired skin integrity and/or pressure ulcers  done

## 2018-03-04 NOTE — PROGRESS NOTES
Neurosurgery Progress Note    Subjective:  No events    Exam:    A&O x3, GCS 15  PERRL, EOMI  Face symm, tongue midline  DIAZ with FS, no drift      BP  Min: 138/65  Max: 138/65  Pulse  Av.8  Min: 60  Max: 85  Resp  Av.3  Min: 13  Max: 77  Temp  Av °C (98.6 °F)  Min: 36.6 °C (97.8 °F)  Max: 37.3 °C (99.1 °F)  SpO2  Av.6 %  Min: 89 %  Max: 97 %    No Data Recorded    Recent Labs      18   0435  18   0530   WBC  5.7  6.5   RBC  4.06*  4.00*   HEMOGLOBIN  12.4  12.1   HEMATOCRIT  37.9  37.7   MCV  93.3  94.3   MCH  30.5  30.3   MCHC  32.7*  32.1*   RDW  48.8  49.9   PLATELETCT  156*  159*   MPV  10.7  11.4     Recent Labs      18   0435  18   0530   SODIUM  144  141   POTASSIUM  3.3*  3.9   CHLORIDE  113*  113*   CO2  24  24   GLUCOSE  106*  103*   BUN  8  9     Recent Labs      18   0435   APTT  24.3*   INR  1.05           Intake/Output       18 07 - 18 0659 18 07 - 18 0659      8504-0347 0015-8213 Total 2438-0400 2463-9062 Total       Intake    P.O.  280  60 340  --  -- --    P.O. 280 60 340 -- -- --    I.V.  600  -- 600  --  -- --    IV Volume (NS) 600 -- 600 -- -- --    Total Intake 880 60 940 -- -- --       Output    Urine  2250  2350 4600  --  -- --    Number of Times Voided 5 x -- 5 x -- -- --    Void (ml) 2250 2350 4600 -- -- --    Stool  --  -- --  --  -- --    Number of Times Stooled 1 x -- 1 x -- -- --    Total Output 2250 2350 4600 -- -- --       Net I/O     -1370 -2290 -3660 -- -- --            Intake/Output Summary (Last 24 hours) at 18 0923  Last data filed at 18 0600   Gross per 24 hour   Intake              690 ml   Output             4100 ml   Net            -3410 ml            • magnesium sulfate  2 g Once   • potassium chloride SA  40 mEq BID   • levETIRAcetam  500 mg BID   • cloNIDine  0.1 mg Q4HRS PRN   • hydrALAZINE  10-20 mg Q4HRS PRN   • 0.9 % NaCl with KCl 40 mEq 1,000 mL   Continuous   • senna-docusate  2  Tab BID    And   • magnesium hydroxide  30 mL QDAY PRN    And   • bisacodyl  10 mg QDAY PRN   • acetaminophen  650 mg Q6HRS PRN   • lisinopril  20 mg DAILY   • niMODipine  60 mg Q4HRS   • Pharmacy Consult Request  1 Each PRN   • oxyCODONE immediate-release  5 mg Q3HRS PRN   • oxyCODONE immediate release  10 mg Q3HRS PRN   • MD ALERT...Do not administer NSAIDS or ASPIRIN unless ORDERED By Neurosurgery  1 Each PRN   • ondansetron  4 mg Q4HRS PRN   • dexamethasone  4 mg Once PRN   • diphenhydrAMINE  25 mg Q6HRS PRN   • scopolamine  1 Patch Q72HRS PRN   • labetalol  10 mg Q10 MIN PRN   • docusate sodium  100 mg BID   • polyethylene glycol/lytes  1 Packet BID PRN   • HYDROmorphone  0.5 mg Q3HRS PRN   • simvastatin  20 mg Q EVENING     Imaging:  CTA shows left MCA occlusion with distal left MCA aneurysm  Angiogram confirms chronic left M1 segment occlusion with development of collaterals, but no definitive aneurysm    Assessment and Plan:  Hospital day #5 SAH/aneurysm; post-SAH day #5  Prophylactic anticoagulation: no         Start date/time: tbd    Moyamoya type pattern of cerebrovascular disease  Treat SAH as ruptured; Nimodipine; SBP <150  No definitive treatment to offer for hemorrhage at this time  Keep on Keppra over weekend, Dr. Noguera to address Monday  Keep in ICU today
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
## 2018-03-04 NOTE — PROGRESS NOTES
Pt refusing medications and being paranoid and wanting to go home.  RN was able to redirect her and pt agreed to take some of her medications but refused IV fluid and other medications.  MD bob.

## 2018-03-04 NOTE — PROGRESS NOTES
1600  The pt's bedalarm went off, this RN went into the room and found pt taking her leads off saying she is going home.  She was unsteady on her feet but refused to be helped.  Pt still A/Ox4, aware of what was going on but kept insisting going home.  She was forcing her way out of the unit, this RN called for help and Dr. Gonda came down to see the patient.  Pt was able to answer orientation questions right but not making rational decisions.  She was becoming increasingly agitated and was forcing her way out of the unit, insisting she is going to walk home (which is 4 hours away) in a hospital gown.  Dr. Gonda and pt's daughter and this RN agreed that pt is not safe to go home and is not competent to make safe medical decisions for herself.  Medications ordered to help calm her down.    1800  Discussed pt's recent even with Dr. Arceo (neuro sx).  Upon reassessment, pt back to baseline, A/Ox4, appropriate and corporative.  Will obtain CT scan if the patient exhibits neuro changes.
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
## 2018-03-05 PROCEDURE — A9270 NON-COVERED ITEM OR SERVICE: HCPCS | Performed by: INTERNAL MEDICINE

## 2018-03-05 PROCEDURE — 700102 HCHG RX REV CODE 250 W/ 637 OVERRIDE(OP): Performed by: INTERNAL MEDICINE

## 2018-03-05 PROCEDURE — 93888 INTRACRANIAL LIMITED STUDY: CPT

## 2018-03-05 PROCEDURE — 770022 HCHG ROOM/CARE - ICU (200)

## 2018-03-05 PROCEDURE — 700102 HCHG RX REV CODE 250 W/ 637 OVERRIDE(OP): Performed by: STUDENT IN AN ORGANIZED HEALTH CARE EDUCATION/TRAINING PROGRAM

## 2018-03-05 PROCEDURE — 94760 N-INVAS EAR/PLS OXIMETRY 1: CPT

## 2018-03-05 PROCEDURE — A9270 NON-COVERED ITEM OR SERVICE: HCPCS | Performed by: STUDENT IN AN ORGANIZED HEALTH CARE EDUCATION/TRAINING PROGRAM

## 2018-03-05 RX ADMIN — NIMODIPINE 60 MG: 30 CAPSULE ORAL at 20:13

## 2018-03-05 RX ADMIN — NIMODIPINE 60 MG: 30 CAPSULE ORAL at 03:52

## 2018-03-05 RX ADMIN — ACETAMINOPHEN 650 MG: 325 TABLET, FILM COATED ORAL at 21:44

## 2018-03-05 RX ADMIN — SIMVASTATIN 20 MG: 20 TABLET, FILM COATED ORAL at 20:13

## 2018-03-05 RX ADMIN — ACETAMINOPHEN 650 MG: 325 TABLET, FILM COATED ORAL at 01:15

## 2018-03-05 RX ADMIN — NIMODIPINE 60 MG: 30 CAPSULE ORAL at 12:56

## 2018-03-05 RX ADMIN — NIMODIPINE 60 MG: 30 CAPSULE ORAL at 09:26

## 2018-03-05 RX ADMIN — ACETAMINOPHEN 650 MG: 325 TABLET, FILM COATED ORAL at 15:05

## 2018-03-05 RX ADMIN — NIMODIPINE 60 MG: 30 CAPSULE ORAL at 01:06

## 2018-03-05 RX ADMIN — NIMODIPINE 60 MG: 30 CAPSULE ORAL at 16:55

## 2018-03-05 RX ADMIN — LEVETIRACETAM 500 MG: 500 TABLET, FILM COATED ORAL at 09:22

## 2018-03-05 RX ADMIN — LEVETIRACETAM 500 MG: 500 TABLET, FILM COATED ORAL at 20:13

## 2018-03-05 RX ADMIN — LISINOPRIL 20 MG: 20 TABLET ORAL at 09:25

## 2018-03-05 ASSESSMENT — PATIENT HEALTH QUESTIONNAIRE - PHQ9
SUM OF ALL RESPONSES TO PHQ9 QUESTIONS 1 AND 2: 0
SUM OF ALL RESPONSES TO PHQ QUESTIONS 1-9: 0
2. FEELING DOWN, DEPRESSED, IRRITABLE, OR HOPELESS: NOT AT ALL
1. LITTLE INTEREST OR PLEASURE IN DOING THINGS: NOT AT ALL

## 2018-03-05 ASSESSMENT — ENCOUNTER SYMPTOMS
COUGH: 0
NAUSEA: 0
SHORTNESS OF BREATH: 0
VOMITING: 0
DOUBLE VISION: 0
MYALGIAS: 0
ABDOMINAL PAIN: 0
BLURRED VISION: 0
FOCAL WEAKNESS: 0
FEVER: 0
HEADACHES: 0
CHILLS: 0
PALPITATIONS: 0
DIZZINESS: 0

## 2018-03-05 ASSESSMENT — PAIN SCALES - GENERAL
PAINLEVEL_OUTOF10: 0
PAINLEVEL_OUTOF10: 1
PAINLEVEL_OUTOF10: 2
PAINLEVEL_OUTOF10: 0
PAINLEVEL_OUTOF10: 1

## 2018-03-05 ASSESSMENT — LIFESTYLE VARIABLES: DO YOU DRINK ALCOHOL: NO

## 2018-03-05 NOTE — CARE PLAN
Problem: Safety  Goal: Will remain free from injury  Outcome: PROGRESSING AS EXPECTED  Fall precautions in place with bed alarm on. Call light in hand. Patient instructed to call for assistance as needed and is compliant with all safety measures.    Problem: Venous Thromboembolism (VTW)/Deep Vein Thrombosis (DVT) Prevention:  Goal: Patient will participate in Venous Thrombosis (VTE)/Deep Vein Thrombosis (DVT)Prevention Measures  Outcome: PROGRESSING SLOWER THAN EXPECTED  Patient educated on rationale and importance of wearing SCD's for VTE prevention, and still refuses to wear.

## 2018-03-05 NOTE — PROGRESS NOTES
1530- OK per Dr. Hummel for patient to transfer to [facility]. Patient transferred on transport monitor and 1L nasal cannula.     1535- Report called to AVA Michelle.

## 2018-03-05 NOTE — PROGRESS NOTES
Patient attempted to get out of bed without calling for assistance and bed alarmed. Call light within reach in bed. Fall precautions in place. Education provided and reinforced importance of calling for assistance. Patient verbalized understanding. Monitoring patient closely.

## 2018-03-05 NOTE — PROGRESS NOTES
Patient refusing lab draw by this RN until the MD comes by in the morning. She was offered a phlebotomist lab draw and refused this as well.

## 2018-03-05 NOTE — CARE PLAN
Problem: Safety  Goal: Will remain free from injury  Patient assessed for risk to fall and safety measures implemented. Educated on importance of using call light and calling for assistance. Bed rails up, call light within reach, bed alarm set.    Problem: Mobility  Goal: Risk for activity intolerance will decrease  Patient mobilized as tolerated. Encouraged active range of motion and assisted as needed. Pain management assessed prior to mobilization to allow for adequate mobility progress.
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
## 2018-03-05 NOTE — PROGRESS NOTES
12 hour chart check complete. Assumed care of patient. Bedside report completed,2 RN skin check done, VSS, patient resting comfortably.
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
## 2018-03-05 NOTE — PROGRESS NOTES
Neurosurgery Progress Note    Subjective:  Denies double vision.  Denies new symptoms. Denies HA. TCds stable    Exam:    A&O x3, GCS 15  Face symm, tongue midline  DIAZ with FS, no drift      Pulse  Av.9  Min: 60  Max: 99  Resp  Av.8  Min: 17  Max: 64  Temp  Av.8 °C (98.2 °F)  Min: 36.4 °C (97.6 °F)  Max: 37 °C (98.6 °F)  SpO2  Av.3 %  Min: 88 %  Max: 96 %    No Data Recorded    Recent Labs      18   0518   1445   WBC  6.5  6.5   RBC  4.00*  4.32   HEMOGLOBIN  12.1  13.3   HEMATOCRIT  37.7  40.4   MCV  94.3  93.5   MCH  30.3  30.8   MCHC  32.1*  32.9*   RDW  49.9  49.6   PLATELETCT  159*  179   MPV  11.4  11.1     Recent Labs      18   1445   SODIUM  141  138   POTASSIUM  3.9  4.9   CHLORIDE  113*  106   CO2  24  25   GLUCOSE  103*  146*   BUN  9  10               Intake/Output       18 07 - 18 0659 18 07 - 18 0659      2817-6228 6493-9080 Total 8036-9769 3085-7578 Total       Intake    P.O.  560  600 1160  --  -- --    P.O.  -- -- --    I.V.  200  -- 200  --  -- --    IV Piggyback Volume (IV Piggyback) 50 -- 50 -- -- --    IV Volume (NS) 150 -- 150 -- -- --    Total Intake  -- -- --       Output    Urine  1100  1450 2550  --  -- --    Number of Times Voided 4 x 5 x 9 x -- -- --    Void (ml) 1100 1450 2550 -- -- --    Stool  --  -- --  --  -- --    Number of Times Stooled 2 x -- 2 x -- -- --    Total Output 1100 1450 2550 -- -- --       Net I/O     -340 -850 -1190 -- -- --            Intake/Output Summary (Last 24 hours) at 18 0842  Last data filed at 18 0600   Gross per 24 hour   Intake             1240 ml   Output             2200 ml   Net             -960 ml            • levETIRAcetam  500 mg BID   • cloNIDine  0.1 mg Q4HRS PRN   • hydrALAZINE  10-20 mg Q4HRS PRN   • senna-docusate  2 Tab BID    And   • magnesium hydroxide  30 mL QDAY PRN    And   • bisacodyl  10 mg QDAY PRN   • acetaminophen   650 mg Q6HRS PRN   • lisinopril  20 mg DAILY   • niMODipine  60 mg Q4HRS   • Pharmacy Consult Request  1 Each PRN   • oxyCODONE immediate-release  5 mg Q3HRS PRN   • oxyCODONE immediate release  10 mg Q3HRS PRN   • MD ALERT...Do not administer NSAIDS or ASPIRIN unless ORDERED By Neurosurgery  1 Each PRN   • ondansetron  4 mg Q4HRS PRN   • dexamethasone  4 mg Once PRN   • diphenhydrAMINE  25 mg Q6HRS PRN   • scopolamine  1 Patch Q72HRS PRN   • labetalol  10 mg Q10 MIN PRN   • docusate sodium  100 mg BID   • polyethylene glycol/lytes  1 Packet BID PRN   • HYDROmorphone  0.5 mg Q3HRS PRN   • simvastatin  20 mg Q EVENING     Imaging:  CTA shows left MCA occlusion with distal left MCA aneurysm  Angiogram confirms chronic left M1 segment occlusion with development of collaterals, but no definitive aneurysm    Assessment and Plan:  Hospital day #6 SAH/aneurysm; post-SAH day #6  Prophylactic anticoagulation: no         Start date/time: tbd    Treat SAH as ruptured; Nimodipine; SBP <150  COntinue TCD  Continue Janeen Stern out of ICU darcy if TCDs and exam remain stable

## 2018-03-06 LAB
ANION GAP SERPL CALC-SCNC: 8 MMOL/L (ref 0–11.9)
BASOPHILS # BLD AUTO: 0.6 % (ref 0–1.8)
BASOPHILS # BLD: 0.03 K/UL (ref 0–0.12)
BUN SERPL-MCNC: 12 MG/DL (ref 8–22)
CALCIUM SERPL-MCNC: 9.4 MG/DL (ref 8.5–10.5)
CHLORIDE SERPL-SCNC: 105 MMOL/L (ref 96–112)
CO2 SERPL-SCNC: 26 MMOL/L (ref 20–33)
CREAT SERPL-MCNC: 0.56 MG/DL (ref 0.5–1.4)
EKG IMPRESSION: NORMAL
EOSINOPHIL # BLD AUTO: 0.13 K/UL (ref 0–0.51)
EOSINOPHIL NFR BLD: 2.5 % (ref 0–6.9)
ERYTHROCYTE [DISTWIDTH] IN BLOOD BY AUTOMATED COUNT: 48.5 FL (ref 35.9–50)
GLUCOSE SERPL-MCNC: 101 MG/DL (ref 65–99)
HCT VFR BLD AUTO: 39.8 % (ref 37–47)
HGB BLD-MCNC: 13.1 G/DL (ref 12–16)
IMM GRANULOCYTES # BLD AUTO: 0.04 K/UL (ref 0–0.11)
IMM GRANULOCYTES NFR BLD AUTO: 0.8 % (ref 0–0.9)
LYMPHOCYTES # BLD AUTO: 1.78 K/UL (ref 1–4.8)
LYMPHOCYTES NFR BLD: 34.7 % (ref 22–41)
MAGNESIUM SERPL-MCNC: 2 MG/DL (ref 1.5–2.5)
MCH RBC QN AUTO: 30.8 PG (ref 27–33)
MCHC RBC AUTO-ENTMCNC: 32.9 G/DL (ref 33.6–35)
MCV RBC AUTO: 93.6 FL (ref 81.4–97.8)
MONOCYTES # BLD AUTO: 0.58 K/UL (ref 0–0.85)
MONOCYTES NFR BLD AUTO: 11.3 % (ref 0–13.4)
NEUTROPHILS # BLD AUTO: 2.57 K/UL (ref 2–7.15)
NEUTROPHILS NFR BLD: 50.1 % (ref 44–72)
NRBC # BLD AUTO: 0 K/UL
NRBC BLD-RTO: 0 /100 WBC
PLATELET # BLD AUTO: 175 K/UL (ref 164–446)
PMV BLD AUTO: 10.6 FL (ref 9–12.9)
POTASSIUM SERPL-SCNC: 3.7 MMOL/L (ref 3.6–5.5)
RBC # BLD AUTO: 4.25 M/UL (ref 4.2–5.4)
SODIUM SERPL-SCNC: 139 MMOL/L (ref 135–145)
WBC # BLD AUTO: 5.1 K/UL (ref 4.8–10.8)

## 2018-03-06 PROCEDURE — 93005 ELECTROCARDIOGRAM TRACING: CPT | Performed by: STUDENT IN AN ORGANIZED HEALTH CARE EDUCATION/TRAINING PROGRAM

## 2018-03-06 PROCEDURE — 700102 HCHG RX REV CODE 250 W/ 637 OVERRIDE(OP): Performed by: INTERNAL MEDICINE

## 2018-03-06 PROCEDURE — 700111 HCHG RX REV CODE 636 W/ 250 OVERRIDE (IP): Performed by: INTERNAL MEDICINE

## 2018-03-06 PROCEDURE — 93888 INTRACRANIAL LIMITED STUDY: CPT

## 2018-03-06 PROCEDURE — A9270 NON-COVERED ITEM OR SERVICE: HCPCS | Performed by: INTERNAL MEDICINE

## 2018-03-06 PROCEDURE — 700112 HCHG RX REV CODE 229: Performed by: NEUROLOGICAL SURGERY

## 2018-03-06 PROCEDURE — 83735 ASSAY OF MAGNESIUM: CPT

## 2018-03-06 PROCEDURE — 93010 ELECTROCARDIOGRAM REPORT: CPT | Performed by: INTERNAL MEDICINE

## 2018-03-06 PROCEDURE — 700102 HCHG RX REV CODE 250 W/ 637 OVERRIDE(OP): Performed by: PSYCHIATRY & NEUROLOGY

## 2018-03-06 PROCEDURE — A9270 NON-COVERED ITEM OR SERVICE: HCPCS | Performed by: STUDENT IN AN ORGANIZED HEALTH CARE EDUCATION/TRAINING PROGRAM

## 2018-03-06 PROCEDURE — 770022 HCHG ROOM/CARE - ICU (200)

## 2018-03-06 PROCEDURE — 700102 HCHG RX REV CODE 250 W/ 637 OVERRIDE(OP): Performed by: STUDENT IN AN ORGANIZED HEALTH CARE EDUCATION/TRAINING PROGRAM

## 2018-03-06 PROCEDURE — 85025 COMPLETE CBC W/AUTO DIFF WBC: CPT

## 2018-03-06 PROCEDURE — 80048 BASIC METABOLIC PNL TOTAL CA: CPT

## 2018-03-06 PROCEDURE — A9270 NON-COVERED ITEM OR SERVICE: HCPCS | Performed by: PSYCHIATRY & NEUROLOGY

## 2018-03-06 PROCEDURE — A9270 NON-COVERED ITEM OR SERVICE: HCPCS | Performed by: NEUROLOGICAL SURGERY

## 2018-03-06 RX ORDER — HALOPERIDOL 5 MG/ML
5 INJECTION INTRAMUSCULAR EVERY 4 HOURS PRN
Status: DISCONTINUED | OUTPATIENT
Start: 2018-03-06 | End: 2018-03-13 | Stop reason: HOSPADM

## 2018-03-06 RX ORDER — HALOPERIDOL 1 MG/1
2 TABLET ORAL 2 TIMES DAILY
Status: DISCONTINUED | OUTPATIENT
Start: 2018-03-06 | End: 2018-03-08

## 2018-03-06 RX ORDER — HALOPERIDOL 5 MG/ML
5 INJECTION INTRAMUSCULAR ONCE
Status: COMPLETED | OUTPATIENT
Start: 2018-03-06 | End: 2018-03-06

## 2018-03-06 RX ORDER — POTASSIUM CHLORIDE 20 MEQ/1
40 TABLET, EXTENDED RELEASE ORAL ONCE
Status: DISPENSED | OUTPATIENT
Start: 2018-03-06 | End: 2018-03-07

## 2018-03-06 RX ADMIN — DOCUSATE SODIUM 100 MG: 100 CAPSULE ORAL at 20:30

## 2018-03-06 RX ADMIN — LEVETIRACETAM 500 MG: 500 TABLET, FILM COATED ORAL at 20:30

## 2018-03-06 RX ADMIN — NIMODIPINE 60 MG: 30 CAPSULE ORAL at 01:00

## 2018-03-06 RX ADMIN — NIMODIPINE 60 MG: 30 CAPSULE ORAL at 03:54

## 2018-03-06 RX ADMIN — NIMODIPINE 60 MG: 30 CAPSULE ORAL at 20:30

## 2018-03-06 RX ADMIN — NIMODIPINE 60 MG: 30 CAPSULE ORAL at 11:02

## 2018-03-06 RX ADMIN — HALOPERIDOL LACTATE 5 MG: 5 INJECTION, SOLUTION INTRAMUSCULAR at 08:01

## 2018-03-06 RX ADMIN — NIMODIPINE 60 MG: 30 CAPSULE ORAL at 15:12

## 2018-03-06 RX ADMIN — SIMVASTATIN 20 MG: 20 TABLET, FILM COATED ORAL at 20:30

## 2018-03-06 RX ADMIN — ACETAMINOPHEN 650 MG: 325 TABLET, FILM COATED ORAL at 11:44

## 2018-03-06 RX ADMIN — STANDARDIZED SENNA CONCENTRATE AND DOCUSATE SODIUM 2 TABLET: 8.6; 5 TABLET, FILM COATED ORAL at 20:30

## 2018-03-06 RX ADMIN — HALOPERIDOL 2 MG: 1 TABLET ORAL at 15:12

## 2018-03-06 RX ADMIN — HALOPERIDOL 2 MG: 1 TABLET ORAL at 20:30

## 2018-03-06 RX ADMIN — ACETAMINOPHEN 650 MG: 325 TABLET, FILM COATED ORAL at 05:38

## 2018-03-06 ASSESSMENT — PAIN SCALES - GENERAL
PAINLEVEL_OUTOF10: 9
PAINLEVEL_OUTOF10: 0
PAINLEVEL_OUTOF10: 2
PAINLEVEL_OUTOF10: 0
PAINLEVEL_OUTOF10: 4
PAINLEVEL_OUTOF10: 0

## 2018-03-06 ASSESSMENT — ENCOUNTER SYMPTOMS
HEADACHES: 0
VOMITING: 0
DOUBLE VISION: 0
MYALGIAS: 0
PALPITATIONS: 0
FEVER: 0
SHORTNESS OF BREATH: 0
FOCAL WEAKNESS: 0
ABDOMINAL PAIN: 0
NAUSEA: 0
CHILLS: 0
DIZZINESS: 0
BLURRED VISION: 0
COUGH: 0
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
## 2018-03-06 NOTE — PROGRESS NOTES
RN to round with the treatment team. MD to review the EKG. RN will attempt to contact family to determine which pacer the pt has. Psych to come and evaluate the pt.
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
## 2018-03-06 NOTE — PROGRESS NOTES
Pt to notify that she has a [employer] pacemaker, RN to notify Dr. Estephanie MD will call to have it evaluated. RN to call Editahalima simons daughter to update on mother. All questions answered at this time. Rn will continue to monitor the pt.
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
## 2018-03-06 NOTE — PROGRESS NOTES
"Dr. Hummel at the bedside to to speak with the pt. Pt continues to say that she \"owns the hospital\" and that \"President dain is going to pick her up.\" Dr. Hummel continues to try and calm the pt. Pt continues to be agitated and aggressive. MD orders for 5 mg of Haldol and for a security sitter to be placed on the pt.   "

## 2018-03-06 NOTE — CARE PLAN
Problem: Communication  Goal: The ability to communicate needs accurately and effectively will improve    Intervention: Educate patient and significant other/support system about the plan of care, procedures, treatments, medications and allow for questions  RN to continue to reorient the pt, RN will continue to discuss the plan of care with the pt throughout the day. RN will continue calming techniques for the pt.       Problem: Pain Management  Goal: Pain level will decrease to patient's comfort goal    Intervention: Follow pain managment plan developed in collaboration with patient and Interdisciplinary Team  RN to assess pts pain at regular intervals, Pt educated to report pain. RN will continue to monitor the pt.

## 2018-03-06 NOTE — THERAPY
SPEECH THERAPY CONTACT NOTE:     This SLP attempted to see patient for dysphagia tx session. Patient's primary RN off unit temporarily, but per covering RN, patient is still agitated, delusional, and is refusing lunch at this time. SLP will hold tx session at this time and will re-attempt later as able and appropriate. Thank you.

## 2018-03-06 NOTE — PROGRESS NOTES
Chelo interrogated. [employer] to speak with Dr. Hummel. MD to notify RN to call [facility] to place transfer orders on the pt. RN to place a phone call.

## 2018-03-06 NOTE — PROGRESS NOTES
Dr. Noguera at the bedside to speak with the pt. MD orders that if TCD this am is normal then pt may be down graded to q 4 hour neuro checks and pt may transfer to the floor.

## 2018-03-07 LAB
ANION GAP SERPL CALC-SCNC: 8 MMOL/L (ref 0–11.9)
BASOPHILS # BLD AUTO: 0.5 % (ref 0–1.8)
BASOPHILS # BLD: 0.03 K/UL (ref 0–0.12)
BUN SERPL-MCNC: 15 MG/DL (ref 8–22)
CALCIUM SERPL-MCNC: 9.2 MG/DL (ref 8.5–10.5)
CHLORIDE SERPL-SCNC: 106 MMOL/L (ref 96–112)
CO2 SERPL-SCNC: 24 MMOL/L (ref 20–33)
CREAT SERPL-MCNC: 0.61 MG/DL (ref 0.5–1.4)
EOSINOPHIL # BLD AUTO: 0.18 K/UL (ref 0–0.51)
EOSINOPHIL NFR BLD: 3.2 % (ref 0–6.9)
ERYTHROCYTE [DISTWIDTH] IN BLOOD BY AUTOMATED COUNT: 49.7 FL (ref 35.9–50)
GLUCOSE SERPL-MCNC: 101 MG/DL (ref 65–99)
HCT VFR BLD AUTO: 42.5 % (ref 37–47)
HGB BLD-MCNC: 13.8 G/DL (ref 12–16)
IMM GRANULOCYTES # BLD AUTO: 0.01 K/UL (ref 0–0.11)
IMM GRANULOCYTES NFR BLD AUTO: 0.2 % (ref 0–0.9)
LYMPHOCYTES # BLD AUTO: 2.03 K/UL (ref 1–4.8)
LYMPHOCYTES NFR BLD: 36.1 % (ref 22–41)
MAGNESIUM SERPL-MCNC: 2.1 MG/DL (ref 1.5–2.5)
MCH RBC QN AUTO: 30.7 PG (ref 27–33)
MCHC RBC AUTO-ENTMCNC: 32.5 G/DL (ref 33.6–35)
MCV RBC AUTO: 94.4 FL (ref 81.4–97.8)
MONOCYTES # BLD AUTO: 0.69 K/UL (ref 0–0.85)
MONOCYTES NFR BLD AUTO: 12.3 % (ref 0–13.4)
NEUTROPHILS # BLD AUTO: 2.69 K/UL (ref 2–7.15)
NEUTROPHILS NFR BLD: 47.7 % (ref 44–72)
NRBC # BLD AUTO: 0 K/UL
NRBC BLD-RTO: 0 /100 WBC
PLATELET # BLD AUTO: 179 K/UL (ref 164–446)
PMV BLD AUTO: 10.6 FL (ref 9–12.9)
POTASSIUM SERPL-SCNC: 3.7 MMOL/L (ref 3.6–5.5)
RBC # BLD AUTO: 4.5 M/UL (ref 4.2–5.4)
SODIUM SERPL-SCNC: 138 MMOL/L (ref 135–145)
WBC # BLD AUTO: 5.6 K/UL (ref 4.8–10.8)

## 2018-03-07 PROCEDURE — A9270 NON-COVERED ITEM OR SERVICE: HCPCS | Performed by: STUDENT IN AN ORGANIZED HEALTH CARE EDUCATION/TRAINING PROGRAM

## 2018-03-07 PROCEDURE — 700102 HCHG RX REV CODE 250 W/ 637 OVERRIDE(OP): Performed by: STUDENT IN AN ORGANIZED HEALTH CARE EDUCATION/TRAINING PROGRAM

## 2018-03-07 PROCEDURE — 700102 HCHG RX REV CODE 250 W/ 637 OVERRIDE(OP): Performed by: INTERNAL MEDICINE

## 2018-03-07 PROCEDURE — 85025 COMPLETE CBC W/AUTO DIFF WBC: CPT

## 2018-03-07 PROCEDURE — 93888 INTRACRANIAL LIMITED STUDY: CPT

## 2018-03-07 PROCEDURE — 770001 HCHG ROOM/CARE - MED/SURG/GYN PRIV*

## 2018-03-07 PROCEDURE — 83735 ASSAY OF MAGNESIUM: CPT

## 2018-03-07 PROCEDURE — A9270 NON-COVERED ITEM OR SERVICE: HCPCS | Performed by: PSYCHIATRY & NEUROLOGY

## 2018-03-07 PROCEDURE — A9270 NON-COVERED ITEM OR SERVICE: HCPCS | Performed by: INTERNAL MEDICINE

## 2018-03-07 PROCEDURE — 80048 BASIC METABOLIC PNL TOTAL CA: CPT

## 2018-03-07 PROCEDURE — 700102 HCHG RX REV CODE 250 W/ 637 OVERRIDE(OP): Performed by: PSYCHIATRY & NEUROLOGY

## 2018-03-07 RX ORDER — DIVALPROEX SODIUM 125 MG/1
250 CAPSULE, COATED PELLETS ORAL EVERY 12 HOURS
Status: DISCONTINUED | OUTPATIENT
Start: 2018-03-07 | End: 2018-03-13 | Stop reason: HOSPADM

## 2018-03-07 RX ORDER — LORAZEPAM 2 MG/ML
0.5 INJECTION INTRAMUSCULAR EVERY 6 HOURS PRN
Status: DISCONTINUED | OUTPATIENT
Start: 2018-03-07 | End: 2018-03-13 | Stop reason: HOSPADM

## 2018-03-07 RX ADMIN — ACETAMINOPHEN 650 MG: 325 TABLET, FILM COATED ORAL at 00:49

## 2018-03-07 RX ADMIN — LEVETIRACETAM 500 MG: 500 TABLET, FILM COATED ORAL at 08:04

## 2018-03-07 RX ADMIN — NIMODIPINE 60 MG: 30 CAPSULE ORAL at 20:00

## 2018-03-07 RX ADMIN — ACETAMINOPHEN 650 MG: 325 TABLET, FILM COATED ORAL at 08:04

## 2018-03-07 RX ADMIN — NIMODIPINE 60 MG: 30 CAPSULE ORAL at 00:48

## 2018-03-07 RX ADMIN — NIMODIPINE 60 MG: 30 CAPSULE ORAL at 04:52

## 2018-03-07 RX ADMIN — NIMODIPINE 60 MG: 30 CAPSULE ORAL at 16:00

## 2018-03-07 RX ADMIN — DIVALPROEX SODIUM 250 MG: 125 CAPSULE, COATED PELLETS ORAL at 21:17

## 2018-03-07 RX ADMIN — ACETAMINOPHEN 650 MG: 325 TABLET, FILM COATED ORAL at 22:52

## 2018-03-07 RX ADMIN — NIMODIPINE 60 MG: 30 CAPSULE ORAL at 12:18

## 2018-03-07 RX ADMIN — HALOPERIDOL 2 MG: 1 TABLET ORAL at 21:17

## 2018-03-07 RX ADMIN — HALOPERIDOL 2 MG: 1 TABLET ORAL at 08:04

## 2018-03-07 RX ADMIN — NIMODIPINE 60 MG: 30 CAPSULE ORAL at 08:04

## 2018-03-07 RX ADMIN — LISINOPRIL 20 MG: 20 TABLET ORAL at 08:04

## 2018-03-07 RX ADMIN — SIMVASTATIN 20 MG: 20 TABLET, FILM COATED ORAL at 21:17

## 2018-03-07 ASSESSMENT — PAIN SCALES - GENERAL
PAINLEVEL_OUTOF10: 0
PAINLEVEL_OUTOF10: 0
PAINLEVEL_OUTOF10: 4
PAINLEVEL_OUTOF10: 0
PAINLEVEL_OUTOF10: 0
PAINLEVEL_OUTOF10: 6
PAINLEVEL_OUTOF10: 0
PAINLEVEL_OUTOF10: 4
PAINLEVEL_OUTOF10: 0

## 2018-03-07 ASSESSMENT — ENCOUNTER SYMPTOMS
FOCAL WEAKNESS: 0
DOUBLE VISION: 0
BLURRED VISION: 0
CHILLS: 0
SHORTNESS OF BREATH: 0
DIZZINESS: 0
FEVER: 0
PALPITATIONS: 0
COUGH: 0
MYALGIAS: 0
HEADACHES: 0
NAUSEA: 0
ABDOMINAL PAIN: 0
VOMITING: 0

## 2018-03-07 NOTE — PROGRESS NOTES
Neurosurgery Progress Note    Subjective:  Still having some hallucinations, delerium. Better    Exam:  Aggitated. Oriented x4. Tongue ML  No drift.  Moves all 4    Pulse  Av.4  Min: 60  Max: 83  Resp  Avg: 15.9  Min: 13  Max: 25  Temp  Av.6 °C (97.8 °F)  Min: 36 °C (96.8 °F)  Max: 37 °C (98.6 °F)  SpO2  Av.5 %  Min: 89 %  Max: 99 %    No Data Recorded    Recent Labs      18   1445  18   0550  18   0551   WBC  6.5  5.1  5.6   RBC  4.32  4.25  4.50   HEMOGLOBIN  13.3  13.1  13.8   HEMATOCRIT  40.4  39.8  42.5   MCV  93.5  93.6  94.4   MCH  30.8  30.8  30.7   MCHC  32.9*  32.9*  32.5*   RDW  49.6  48.5  49.7   PLATELETCT  179  175  179   MPV  11.1  10.6  10.6     Recent Labs      18   1445  18   0550  18   0552   SODIUM  138  139  138   POTASSIUM  4.9  3.7  3.7   CHLORIDE  106  105  106   CO2  25  26  24   GLUCOSE  146*  101*  101*   BUN  10  12  15               Intake/Output       18 - 18 0659 18 - 18 0659      3271-6841 8087-1095 Total 1900-0659 Total       Intake    P.O.  425  720 1145  --  -- --    P.O.  -- -- --    Total Intake  -- -- --       Output    Urine  775  1050 1825  --  -- --    Number of Times Voided 2 x 1 x 3 x -- -- --    Void (ml) 775 1050 1825 -- -- --    Stool/Urine  0  -- 0  --  -- --    Measurable Stool (ml) 0 -- 0 -- -- --    Stool  --  -- --  --  -- --    Number of Times Stooled 0 x -- 0 x -- -- --    Total Output 775 1050 1825 -- -- --       Net I/O     -350 -330 -680 -- -- --            Intake/Output Summary (Last 24 hours) at 18 1040  Last data filed at 18 0600   Gross per 24 hour   Intake              945 ml   Output             1525 ml   Net             -580 ml            • haloperidol lactate  5 mg Q4HRS PRN   • haloperidol  2 mg BID   • levETIRAcetam  500 mg BID   • cloNIDine  0.1 mg Q4HRS PRN   • hydrALAZINE  10-20 mg Q4HRS PRN   • senna-docusate  2 Tab  BID    And   • magnesium hydroxide  30 mL QDAY PRN    And   • bisacodyl  10 mg QDAY PRN   • acetaminophen  650 mg Q6HRS PRN   • lisinopril  20 mg DAILY   • niMODipine  60 mg Q4HRS   • Pharmacy Consult Request  1 Each PRN   • oxyCODONE immediate-release  5 mg Q3HRS PRN   • oxyCODONE immediate release  10 mg Q3HRS PRN   • MD ALERT...Do not administer NSAIDS or ASPIRIN unless ORDERED By Neurosurgery  1 Each PRN   • ondansetron  4 mg Q4HRS PRN   • dexamethasone  4 mg Once PRN   • diphenhydrAMINE  25 mg Q6HRS PRN   • scopolamine  1 Patch Q72HRS PRN   • labetalol  10 mg Q10 MIN PRN   • docusate sodium  100 mg BID   • polyethylene glycol/lytes  1 Packet BID PRN   • HYDROmorphone  0.5 mg Q3HRS PRN   • simvastatin  20 mg Q EVENING       Assessment and Plan:  Hospital day #8  Prophylactic anticoagulation: no  Ok out of ICU  Continue TCDs stable  Continue nimodipine  Plan on angiogram on Friday  DW Dr Noguera, and Dr Will jara to change to [facility]

## 2018-03-07 NOTE — CONSULTS
"Reason for PC Consult: Advance Care Planning    Consulted by: Dr. Hummel, PMA    Assessment:  General: 73 y/o female admitted with SAH from [place]; also endorsed audible hallucinations for 6 weeks, lack of balance, headache and L hand numbness. 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 PMHx of HTN, HLD, pacemaker. Molly lives alone in [place]; daughter Cindy lives in [place], [place] and other daughter lives in [place] with \"plans\" to move to [place], though Molly does not feel there is actually a solid plan for this to actually take place.    Consults: Neurosurgery, psych, PMA    Dyspnea: No-    Last BM: 03/05/18-    Pain: No-    Depression: No-    Dementia: No;       Spiritual:  Is Hinduism or spirituality important for coping with this illness? No-    Has a  or spiritual provider visit been requested? No    Palliative Performance Scale: 40%    Advance Directive: None-    DPOA: No-    POLST: No- completed today    Code Status: DNR-      Outcome:  PC RN met with Molly at bedside and explained the role of PC. She was AOx4, new the purpose of her admission and reported \"not being able to drive any longer\" due to the reason for admission. She relayed the conversation with Dr. Hummel and expressed her wish to not have further aggressive treatments and being okay with EoL, if that's what is coming. She reports wanting to \"go home\" but recognizes needing more help. She feels that she could elicit some help from her neighbors/friends but does not have anyone's contact information here given she was transferred from the hospital. PC RN asked if she was open to SNF placement in [place] area with the plan of regaining strength before going home, but also allowing her to be closer to home to be able to line up some of her help and resources to get home. PC RN explained the POLST and completed it noting her wishes for DNR and comfort focused treatment. She wished to keep it to read " today prior to signing; this RN offered to come back tomorrow to f/u and she was agreeable. PC RN updated MD with plan for SNF in [place] as a bridge to getting home and he was agreeable; SNF order to be place.     While talking to Molly, PC RN provided active listening, normalization, validation of thoughts and feelings, as well as reinforced her goals of care. PC contact information provided to Molly and encouraged to call with any questions or concerns.     Updated: BS RN/MD/JADE (Gerber will send choice)    Plan: SNF for therapy prior to home with assistance    Recommendations: I do not recommend an ethics or hospice consult at this time because plan for SNF as a bridge to get home.    Thank you for allowing Palliative Care to participate in this patient's care. Please feel free to call [phone] with any questions or concerns.

## 2018-03-07 NOTE — PROGRESS NOTES
"Neurosurgery Progress Note    Subjective:  Patient was aggressive to nursing staff and tried to leave hospital this morning  Making statements that she \"owned the hospital\" and was \"going to call Tenzin Hernández\"    Exam:  Alert  Mildly agitated  Moves all 4; currently in restraints    BP  Min: 127/83  Max: 127/83  Pulse  Av.8  Min: 60  Max: 85  Resp  Av.5  Min: 13  Max: 20  Temp  Av.7 °C (98.1 °F)  Min: 36.5 °C (97.7 °F)  Max: 37 °C (98.6 °F)  SpO2  Av.9 %  Min: 94 %  Max: 98 %    No Data Recorded    Recent Labs      18   1445  18   0550   WBC  6.5  5.1   RBC  4.32  4.25   HEMOGLOBIN  13.3  13.1   HEMATOCRIT  40.4  39.8   MCV  93.5  93.6   MCH  30.8  30.8   MCHC  32.9*  32.9*   RDW  49.6  48.5   PLATELETCT  179  175   MPV  11.1  10.6     Recent Labs      18   1445  18   0550   SODIUM  138  139   POTASSIUM  4.9  3.7   CHLORIDE  106  105   CO2  25  26   GLUCOSE  146*  101*   BUN  10  12               Intake/Output       18 0700 - 18 0659 18 07 - 18 0659      9023-8336 4142-1972 Total 3211-3513 7406-2063 Total       Intake    P.O.  1300  120 1420  425  240 665    P.O. 8716 345 1046 425 240 665    Total Intake 9822 470 3368 425 240 665       Output    Urine  800  1025 1825  775  150 925    Number of Times Voided 2 x -- 2 x 2 x 1 x 3 x    Void (ml) 800 1025 1825 775 150 925    Stool/Urine  0  -- 0  0  -- 0    Measurable Stool (ml) 0 -- 0 0 -- 0    Stool  --  -- --  --  -- --    Number of Times Stooled 2 x -- 2 x 0 x -- 0 x    Total Output 800 1025 1825 775 150 925       Net I/O     500 -805 -405 -350 90 -260            Intake/Output Summary (Last 24 hours) at 18 6304  Last data filed at 18   Gross per 24 hour   Intake              665 ml   Output             1450 ml   Net             -785 ml            • potassium chloride SA  40 mEq Once   • haloperidol lactate  5 mg Q4HRS PRN   • haloperidol  2 mg BID   • levETIRAcetam  500 mg BID   • " cloNIDine  0.1 mg Q4HRS PRN   • hydrALAZINE  10-20 mg Q4HRS PRN   • senna-docusate  2 Tab BID    And   • magnesium hydroxide  30 mL QDAY PRN    And   • bisacodyl  10 mg QDAY PRN   • acetaminophen  650 mg Q6HRS PRN   • lisinopril  20 mg DAILY   • niMODipine  60 mg Q4HRS   • Pharmacy Consult Request  1 Each PRN   • oxyCODONE immediate-release  5 mg Q3HRS PRN   • oxyCODONE immediate release  10 mg Q3HRS PRN   • MD ALERT...Do not administer NSAIDS or ASPIRIN unless ORDERED By Neurosurgery  1 Each PRN   • ondansetron  4 mg Q4HRS PRN   • dexamethasone  4 mg Once PRN   • diphenhydrAMINE  25 mg Q6HRS PRN   • scopolamine  1 Patch Q72HRS PRN   • labetalol  10 mg Q10 MIN PRN   • docusate sodium  100 mg BID   • polyethylene glycol/lytes  1 Packet BID PRN   • HYDROmorphone  0.5 mg Q3HRS PRN   • simvastatin  20 mg Q EVENING       Assessment and Plan:  Hospital day #7  Prophylactic anticoagulation: no  Psychiatry consulted; appreciate their input, however on admission patient reported auditory hallucinations, and these happened before the subarachnoid hemorrhage, therefore I do not believe she is delirious from SAH/hospitalization, but that she is experiencing psychosis
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
## 2018-03-07 NOTE — CARE PLAN
Problem: Skin Integrity  Goal: Risk for impaired skin integrity will decrease    Intervention: Assess risk factors for impaired skin integrity and/or pressure ulcers  Assessed patient skin and explained importance on continuing to check skin.

## 2018-03-07 NOTE — PROGRESS NOTES
"I had a long discussion with the patient today. She is much more calm cooperative and oriented ×4. She tells me she really just wants to go home. She says she does not want to have any surgery ever, stating \"just help make it home. I am a 72, if I passed away it is okay\". In my opinion she currently has capacity to make these decisions and this is a relatively reasonable choice. I will consult palliative care and hospice. She is DO NOT RESUSCITATE.  "
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
## 2018-03-07 NOTE — CARE PLAN
Problem: Safety  Goal: Will remain free from injury  Outcome: PROGRESSING SLOWER THAN EXPECTED    Goal: Will remain free from falls  Outcome: PROGRESSING SLOWER THAN EXPECTED      Problem: Psychosocial Needs:  Goal: Level of anxiety will decrease  Outcome: PROGRESSING SLOWER THAN EXPECTED

## 2018-03-08 LAB
ANION GAP SERPL CALC-SCNC: 10 MMOL/L (ref 0–11.9)
BASOPHILS # BLD AUTO: 0.7 % (ref 0–1.8)
BASOPHILS # BLD: 0.04 K/UL (ref 0–0.12)
BUN SERPL-MCNC: 19 MG/DL (ref 8–22)
CALCIUM SERPL-MCNC: 8.7 MG/DL (ref 8.5–10.5)
CHLORIDE SERPL-SCNC: 105 MMOL/L (ref 96–112)
CO2 SERPL-SCNC: 25 MMOL/L (ref 20–33)
CREAT SERPL-MCNC: 0.62 MG/DL (ref 0.5–1.4)
EOSINOPHIL # BLD AUTO: 0.13 K/UL (ref 0–0.51)
EOSINOPHIL NFR BLD: 2.2 % (ref 0–6.9)
ERYTHROCYTE [DISTWIDTH] IN BLOOD BY AUTOMATED COUNT: 48.2 FL (ref 35.9–50)
GLUCOSE SERPL-MCNC: 107 MG/DL (ref 65–99)
HCT VFR BLD AUTO: 42 % (ref 37–47)
HGB BLD-MCNC: 13.6 G/DL (ref 12–16)
IMM GRANULOCYTES # BLD AUTO: 0.02 K/UL (ref 0–0.11)
IMM GRANULOCYTES NFR BLD AUTO: 0.3 % (ref 0–0.9)
LYMPHOCYTES # BLD AUTO: 2.18 K/UL (ref 1–4.8)
LYMPHOCYTES NFR BLD: 37.3 % (ref 22–41)
MCH RBC QN AUTO: 30.1 PG (ref 27–33)
MCHC RBC AUTO-ENTMCNC: 32.4 G/DL (ref 33.6–35)
MCV RBC AUTO: 92.9 FL (ref 81.4–97.8)
MONOCYTES # BLD AUTO: 0.73 K/UL (ref 0–0.85)
MONOCYTES NFR BLD AUTO: 12.5 % (ref 0–13.4)
NEUTROPHILS # BLD AUTO: 2.74 K/UL (ref 2–7.15)
NEUTROPHILS NFR BLD: 47 % (ref 44–72)
NRBC # BLD AUTO: 0 K/UL
NRBC BLD-RTO: 0 /100 WBC
PLATELET # BLD AUTO: 202 K/UL (ref 164–446)
PMV BLD AUTO: 10.3 FL (ref 9–12.9)
POTASSIUM SERPL-SCNC: 3.6 MMOL/L (ref 3.6–5.5)
RBC # BLD AUTO: 4.52 M/UL (ref 4.2–5.4)
SODIUM SERPL-SCNC: 140 MMOL/L (ref 135–145)
WBC # BLD AUTO: 5.8 K/UL (ref 4.8–10.8)

## 2018-03-08 PROCEDURE — 700102 HCHG RX REV CODE 250 W/ 637 OVERRIDE(OP): Performed by: PSYCHIATRY & NEUROLOGY

## 2018-03-08 PROCEDURE — A9270 NON-COVERED ITEM OR SERVICE: HCPCS | Performed by: PSYCHIATRY & NEUROLOGY

## 2018-03-08 PROCEDURE — 700112 HCHG RX REV CODE 229: Performed by: NEUROLOGICAL SURGERY

## 2018-03-08 PROCEDURE — 93888 INTRACRANIAL LIMITED STUDY: CPT

## 2018-03-08 PROCEDURE — 700102 HCHG RX REV CODE 250 W/ 637 OVERRIDE(OP): Performed by: INTERNAL MEDICINE

## 2018-03-08 PROCEDURE — 700102 HCHG RX REV CODE 250 W/ 637 OVERRIDE(OP): Performed by: STUDENT IN AN ORGANIZED HEALTH CARE EDUCATION/TRAINING PROGRAM

## 2018-03-08 PROCEDURE — A9270 NON-COVERED ITEM OR SERVICE: HCPCS | Performed by: INTERNAL MEDICINE

## 2018-03-08 PROCEDURE — 85025 COMPLETE CBC W/AUTO DIFF WBC: CPT

## 2018-03-08 PROCEDURE — G8998 SWALLOW D/C STATUS: HCPCS | Mod: CH

## 2018-03-08 PROCEDURE — 80048 BASIC METABOLIC PNL TOTAL CA: CPT

## 2018-03-08 PROCEDURE — A9270 NON-COVERED ITEM OR SERVICE: HCPCS | Performed by: STUDENT IN AN ORGANIZED HEALTH CARE EDUCATION/TRAINING PROGRAM

## 2018-03-08 PROCEDURE — 92526 ORAL FUNCTION THERAPY: CPT

## 2018-03-08 PROCEDURE — 770006 HCHG ROOM/CARE - MED/SURG/GYN SEMI*

## 2018-03-08 PROCEDURE — A9270 NON-COVERED ITEM OR SERVICE: HCPCS | Performed by: NEUROLOGICAL SURGERY

## 2018-03-08 PROCEDURE — G8997 SWALLOW GOAL STATUS: HCPCS | Mod: CH

## 2018-03-08 RX ORDER — POTASSIUM CHLORIDE 20 MEQ/1
40 TABLET, EXTENDED RELEASE ORAL ONCE
Status: COMPLETED | OUTPATIENT
Start: 2018-03-08 | End: 2018-03-08

## 2018-03-08 RX ORDER — HALOPERIDOL 2 MG/1
2 TABLET ORAL 3 TIMES DAILY
Status: DISCONTINUED | OUTPATIENT
Start: 2018-03-08 | End: 2018-03-13 | Stop reason: HOSPADM

## 2018-03-08 RX ADMIN — LISINOPRIL 20 MG: 20 TABLET ORAL at 12:52

## 2018-03-08 RX ADMIN — HALOPERIDOL 2 MG: 2 TABLET ORAL at 21:16

## 2018-03-08 RX ADMIN — DIVALPROEX SODIUM 250 MG: 125 CAPSULE, COATED PELLETS ORAL at 09:15

## 2018-03-08 RX ADMIN — STANDARDIZED SENNA CONCENTRATE AND DOCUSATE SODIUM 2 TABLET: 8.6; 5 TABLET, FILM COATED ORAL at 21:06

## 2018-03-08 RX ADMIN — ACETAMINOPHEN 650 MG: 325 TABLET, FILM COATED ORAL at 03:56

## 2018-03-08 RX ADMIN — DOCUSATE SODIUM 100 MG: 100 CAPSULE ORAL at 12:52

## 2018-03-08 RX ADMIN — NIMODIPINE 60 MG: 30 CAPSULE ORAL at 12:53

## 2018-03-08 RX ADMIN — POTASSIUM CHLORIDE 40 MEQ: 1500 TABLET, EXTENDED RELEASE ORAL at 09:16

## 2018-03-08 RX ADMIN — HALOPERIDOL 2 MG: 1 TABLET ORAL at 09:15

## 2018-03-08 RX ADMIN — HALOPERIDOL 2 MG: 2 TABLET ORAL at 15:31

## 2018-03-08 RX ADMIN — DIVALPROEX SODIUM 250 MG: 125 CAPSULE, COATED PELLETS ORAL at 21:07

## 2018-03-08 RX ADMIN — NIMODIPINE 60 MG: 30 CAPSULE ORAL at 00:00

## 2018-03-08 RX ADMIN — NIMODIPINE 60 MG: 30 CAPSULE ORAL at 15:31

## 2018-03-08 RX ADMIN — DOCUSATE SODIUM 100 MG: 100 CAPSULE ORAL at 21:07

## 2018-03-08 RX ADMIN — SIMVASTATIN 20 MG: 20 TABLET, FILM COATED ORAL at 21:06

## 2018-03-08 RX ADMIN — NIMODIPINE 60 MG: 30 CAPSULE ORAL at 04:00

## 2018-03-08 ASSESSMENT — PAIN SCALES - GENERAL
PAINLEVEL_OUTOF10: 0
PAINLEVEL_OUTOF10: 2
PAINLEVEL_OUTOF10: 6
PAINLEVEL_OUTOF10: 0
PAINLEVEL_OUTOF10: 0
PAINLEVEL_OUTOF10: 2
PAINLEVEL_OUTOF10: 2
PAINLEVEL_OUTOF10: 0
PAINLEVEL_OUTOF10: 6

## 2018-03-08 ASSESSMENT — ENCOUNTER SYMPTOMS
SHORTNESS OF BREATH: 0
MYALGIAS: 0
HEADACHES: 0
FOCAL WEAKNESS: 0
FEVER: 0
VOMITING: 0
COUGH: 0
CHILLS: 0
BLURRED VISION: 0
PALPITATIONS: 0
DOUBLE VISION: 0
ABDOMINAL PAIN: 0
NAUSEA: 0
DIZZINESS: 0

## 2018-03-08 NOTE — DISCHARGE PLANNING
Received choice form from Ashlyn). Referral sent to [facility], [facility] and [facility]. SNF order pending. Ashlyn) notified via VM.
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
## 2018-03-08 NOTE — PALLIATIVE CARE
"Palliative Care follow-up  PC RN visited bedside to f/u on POLST. Spoke with Molly and daughter Edita at . Edita is aware of the situation and her mom's wishes to not return to the hospital, etc. The POLST was explained again and had been signed by Molly; MD aware it needs his signature. Plan is still to return to [place] area to SNF with plan of returning home with in-home caregiver/support upon DC from the SNF. Pt and daughter aware that PT/OT will help guide plan moving forward. They continue to look for in-home resources for Molly when she returns home. PC RN received # [phone] from Edita stating \"these people may have some more information.\" PC RN will call today.    Updated: MD/RN    Plan: POLST signature/PT and OT eval    Thank you for allowing Palliative Care to participate in this patient's care. Please feel free to call [phone] with any questions or concerns.  "
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
## 2018-03-08 NOTE — PSYCHIATRY
"Patient seen and assessed. Patient continues to present as paranoid and provided a vague answer (\"not really\") in regards to whether or not she believes that Tenzin Hernández is coming to the hospital to pick her up. Per chart review, the patient also continues to become agitated in the late afternoon/evening and asking to leave to go home (even though during the day she is agreeing to SNF placement.) Due to this inconsistency in her behavior and her current presentation, the patient remains INCAPACITATED.    Pt is NOT capacitated to make the decision to leave AMA. In order to hold an  incapacitated pt on a medical hold, there must ALSO be an imminent  risk of death and/or disability as well. The final decision to hold or not hold is up to the treatment team.     If there are any questions please contact Risk Management [phone].    Full note to follow.   "

## 2018-03-08 NOTE — PROGRESS NOTES
Neurosurgery Progress Note    Subjective:  Sitter at bedside.  No changes.     Exam:  Oriented x4. Tongue ML  No drift.  Moves all 4    BP  Min: 95/52  Max: 95/52  Pulse  Av.7  Min: 60  Max: 100  Resp  Av.1  Min: 13  Max: 70  Temp  Av.4 °C (97.5 °F)  Min: 36.1 °C (96.9 °F)  Max: 37 °C (98.6 °F)  SpO2  Av.9 %  Min: 90 %  Max: 100 %    No Data Recorded    Recent Labs      18   0550  18   0551  18   0540   WBC  5.1  5.6  5.8   RBC  4.25  4.50  4.52   HEMOGLOBIN  13.1  13.8  13.6   HEMATOCRIT  39.8  42.5  42.0   MCV  93.6  94.4  92.9   MCH  30.8  30.7  30.1   MCHC  32.9*  32.5*  32.4*   RDW  48.5  49.7  48.2   PLATELETCT  175  179  202   MPV  10.6  10.6  10.3     Recent Labs      18   0550  18   0552  18   0540   SODIUM  139  138  140   POTASSIUM  3.7  3.7  3.6   CHLORIDE  105  106  105   CO2  26  24  25   GLUCOSE  101*  101*  107*   BUN  12  15  19               Intake/Output       18 07 - 18 0659 18 - 1859       Total  Total       Intake    P.O.  --  240 240  --  -- --    P.O. -- 240 240 -- -- --    Total Intake -- 240 240 -- -- --       Output    Urine  500  1000 1500  --  -- --    Void (ml) 500 1000 1500 -- -- --    Total Output 500 1000 1500 -- -- --       Net I/O     -500 -760 -1260 -- -- --            Intake/Output Summary (Last 24 hours) at 18 0830  Last data filed at 18 0300   Gross per 24 hour   Intake              240 ml   Output             1500 ml   Net            -1260 ml            • potassium chloride SA  40 mEq Once   • Divalproex Sodium  250 mg Q12HRS   • LORazepam  0.5 mg Q6HRS PRN   • haloperidol lactate  5 mg Q4HRS PRN   • haloperidol  2 mg BID   • cloNIDine  0.1 mg Q4HRS PRN   • hydrALAZINE  10-20 mg Q4HRS PRN   • senna-docusate  2 Tab BID    And   • magnesium hydroxide  30 mL QDAY PRN    And   • bisacodyl  10 mg QDAY PRN   • acetaminophen  650 mg Q6HRS PRN   •  lisinopril  20 mg DAILY   • niMODipine  60 mg Q4HRS   • Pharmacy Consult Request  1 Each PRN   • oxyCODONE immediate-release  5 mg Q3HRS PRN   • oxyCODONE immediate release  10 mg Q3HRS PRN   • MD ALERT...Do not administer NSAIDS or ASPIRIN unless ORDERED By Neurosurgery  1 Each PRN   • ondansetron  4 mg Q4HRS PRN   • dexamethasone  4 mg Once PRN   • diphenhydrAMINE  25 mg Q6HRS PRN   • scopolamine  1 Patch Q72HRS PRN   • labetalol  10 mg Q10 MIN PRN   • docusate sodium  100 mg BID   • polyethylene glycol/lytes  1 Packet BID PRN   • HYDROmorphone  0.5 mg Q3HRS PRN   • simvastatin  20 mg Q EVENING       Assessment and Plan:  Hospital day #9  Prophylactic anticoagulation: no  Ok out of ICU  Continue TCDs stable  Continue nimodipine  Plan on angiogram on Friday  Continue Depakote- 3-6 mon

## 2018-03-08 NOTE — CARE PLAN
Problem: Safety  Goal: Will remain free from injury  Bed rails up, call light within reach, bed alarm set.     Problem: Mobility  Goal: Risk for activity intolerance will decrease  Pain management assessed prior to mobilization to allow for adequate mobility progress. Patient mobilized to bedside commode as tolerated.

## 2018-03-08 NOTE — THERAPY
"Speech Language Therapy dysphagia treatment completed.     Functional Status:  Pt was seen at breakfast with a regular meal tray.  She was AAOx2, with mild anxiety noted.  Pt consumed soft solids, dry solids and thin liquids without any overt s/sx of aspiration.  Vocal quality remained clear throughout meal.  Pt was educated regarding s/sx of aspiration to be aware of and verbalized understanding, although she needs reinforcement.  Continue a regular diet with thin liquids.       Recommendations: continue a regular diet with thin liquids     Plan of Care: Patient with no further skilled SLP needs in the acute care setting at this time    Post-Acute Therapy: Currently anticipate no further skilled therapy needs once patient is discharged from the inpatient setting.    See \"Rehab Therapy-Acute\" Patient Summary Report for complete documentation.     "

## 2018-03-08 NOTE — PSYCHIATRY
Pt not seen but d/w psychologist. Appears she is still paranoid, and agitation is occurring in late afternoon.     Increasing haldol to tid dosing.

## 2018-03-08 NOTE — PROGRESS NOTES
CRITICAL CARE MEDICINE   BRIEF PROGRESS NOTE    Date of service: 3/7/2018  Time: 2057    I was notified earlier by my partner Dr. Hummel at shift change at the patient had at that time been wanting to leave against medical advice and it was felt that she had possible capacity to make that decision. However the patient's family, daughter arrived from [place] after [address] and said that the patient herself has no ability to have people to look after her and the concern from family is that she is not able to get the care that she would need especially trying to leave at this time of the evening of almost 9 PM and the risk related to vasospasm and other concerns of subarachnoid hemorrhage usually occur between the 7-14 day time. This is what I explained to the patient and family. The nurse at bedside also agrees.     I did also speak again with Dr. Hummel regarding this and we agreed that this was best to keep sitter at bedside and keep the patient intensive care unit. I will add orders for comfort if needed and there is already a note by psychiatry from yesterday. I would recommend psychiatrist reevaluation for decision making again since their previous note from March 6 of 2018 states that the patient should not leave against medical advice, and even be kept in legal hold.    I explained all these details at length with patient's daughter as well as the patient and at this point she will stay in the intensive care unit tonight.    Benjie Paez D.O.  Critical Care Medicine
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
## 2018-03-08 NOTE — PROGRESS NOTES
Dr. Hummel spoke with patient regarding safety of staying vs leaving home. Spoke with patient regarding risk of leaving AMA, patient stated she wants her clothes and to leave. Informed patient that daughter was on her way from [place] now.

## 2018-03-09 LAB
ANION GAP SERPL CALC-SCNC: 7 MMOL/L (ref 0–11.9)
BASOPHILS # BLD AUTO: 0.5 % (ref 0–1.8)
BASOPHILS # BLD: 0.03 K/UL (ref 0–0.12)
BUN SERPL-MCNC: 13 MG/DL (ref 8–22)
CALCIUM SERPL-MCNC: 9.3 MG/DL (ref 8.5–10.5)
CHLORIDE SERPL-SCNC: 107 MMOL/L (ref 96–112)
CO2 SERPL-SCNC: 25 MMOL/L (ref 20–33)
CREAT SERPL-MCNC: 0.61 MG/DL (ref 0.5–1.4)
EOSINOPHIL # BLD AUTO: 0.13 K/UL (ref 0–0.51)
EOSINOPHIL NFR BLD: 2.1 % (ref 0–6.9)
ERYTHROCYTE [DISTWIDTH] IN BLOOD BY AUTOMATED COUNT: 49.1 FL (ref 35.9–50)
GLUCOSE SERPL-MCNC: 97 MG/DL (ref 65–99)
HCT VFR BLD AUTO: 42.7 % (ref 37–47)
HGB BLD-MCNC: 13.8 G/DL (ref 12–16)
IMM GRANULOCYTES # BLD AUTO: 0.01 K/UL (ref 0–0.11)
IMM GRANULOCYTES NFR BLD AUTO: 0.2 % (ref 0–0.9)
LYMPHOCYTES # BLD AUTO: 2.37 K/UL (ref 1–4.8)
LYMPHOCYTES NFR BLD: 38.6 % (ref 22–41)
MCH RBC QN AUTO: 30.3 PG (ref 27–33)
MCHC RBC AUTO-ENTMCNC: 32.3 G/DL (ref 33.6–35)
MCV RBC AUTO: 93.6 FL (ref 81.4–97.8)
MONOCYTES # BLD AUTO: 0.81 K/UL (ref 0–0.85)
MONOCYTES NFR BLD AUTO: 13.2 % (ref 0–13.4)
NEUTROPHILS # BLD AUTO: 2.79 K/UL (ref 2–7.15)
NEUTROPHILS NFR BLD: 45.4 % (ref 44–72)
NRBC # BLD AUTO: 0 K/UL
NRBC BLD-RTO: 0 /100 WBC
PLATELET # BLD AUTO: 181 K/UL (ref 164–446)
PMV BLD AUTO: 10.5 FL (ref 9–12.9)
POTASSIUM SERPL-SCNC: 4 MMOL/L (ref 3.6–5.5)
RBC # BLD AUTO: 4.56 M/UL (ref 4.2–5.4)
SODIUM SERPL-SCNC: 139 MMOL/L (ref 135–145)
WBC # BLD AUTO: 6.1 K/UL (ref 4.8–10.8)

## 2018-03-09 PROCEDURE — 97165 OT EVAL LOW COMPLEX 30 MIN: CPT

## 2018-03-09 PROCEDURE — 80048 BASIC METABOLIC PNL TOTAL CA: CPT

## 2018-03-09 PROCEDURE — 700102 HCHG RX REV CODE 250 W/ 637 OVERRIDE(OP): Performed by: INTERNAL MEDICINE

## 2018-03-09 PROCEDURE — 770006 HCHG ROOM/CARE - MED/SURG/GYN SEMI*

## 2018-03-09 PROCEDURE — 700102 HCHG RX REV CODE 250 W/ 637 OVERRIDE(OP): Performed by: PSYCHIATRY & NEUROLOGY

## 2018-03-09 PROCEDURE — A9270 NON-COVERED ITEM OR SERVICE: HCPCS | Performed by: PSYCHIATRY & NEUROLOGY

## 2018-03-09 PROCEDURE — 36415 COLL VENOUS BLD VENIPUNCTURE: CPT

## 2018-03-09 PROCEDURE — A9270 NON-COVERED ITEM OR SERVICE: HCPCS | Performed by: INTERNAL MEDICINE

## 2018-03-09 PROCEDURE — 93888 INTRACRANIAL LIMITED STUDY: CPT

## 2018-03-09 PROCEDURE — G8978 MOBILITY CURRENT STATUS: HCPCS | Mod: CJ

## 2018-03-09 PROCEDURE — G8988 SELF CARE GOAL STATUS: HCPCS | Mod: CI

## 2018-03-09 PROCEDURE — 99232 SBSQ HOSP IP/OBS MODERATE 35: CPT | Mod: GC | Performed by: HOSPITALIST

## 2018-03-09 PROCEDURE — G8979 MOBILITY GOAL STATUS: HCPCS | Mod: CI

## 2018-03-09 PROCEDURE — A9270 NON-COVERED ITEM OR SERVICE: HCPCS | Performed by: STUDENT IN AN ORGANIZED HEALTH CARE EDUCATION/TRAINING PROGRAM

## 2018-03-09 PROCEDURE — G8987 SELF CARE CURRENT STATUS: HCPCS | Mod: CJ

## 2018-03-09 PROCEDURE — 700102 HCHG RX REV CODE 250 W/ 637 OVERRIDE(OP): Performed by: STUDENT IN AN ORGANIZED HEALTH CARE EDUCATION/TRAINING PROGRAM

## 2018-03-09 PROCEDURE — 97162 PT EVAL MOD COMPLEX 30 MIN: CPT

## 2018-03-09 PROCEDURE — 85025 COMPLETE CBC W/AUTO DIFF WBC: CPT

## 2018-03-09 RX ADMIN — HALOPERIDOL 2 MG: 2 TABLET ORAL at 21:11

## 2018-03-09 RX ADMIN — DIVALPROEX SODIUM 250 MG: 125 CAPSULE, COATED PELLETS ORAL at 21:11

## 2018-03-09 RX ADMIN — LISINOPRIL 20 MG: 20 TABLET ORAL at 10:12

## 2018-03-09 RX ADMIN — HALOPERIDOL 2 MG: 2 TABLET ORAL at 10:09

## 2018-03-09 RX ADMIN — DIVALPROEX SODIUM 250 MG: 125 CAPSULE, COATED PELLETS ORAL at 10:13

## 2018-03-09 RX ADMIN — NIMODIPINE 60 MG: 30 CAPSULE ORAL at 18:14

## 2018-03-09 RX ADMIN — NIMODIPINE 60 MG: 30 CAPSULE ORAL at 21:15

## 2018-03-09 RX ADMIN — HALOPERIDOL 2 MG: 2 TABLET ORAL at 14:49

## 2018-03-09 RX ADMIN — SIMVASTATIN 20 MG: 20 TABLET, FILM COATED ORAL at 21:11

## 2018-03-09 RX ADMIN — NIMODIPINE 60 MG: 30 CAPSULE ORAL at 10:10

## 2018-03-09 ASSESSMENT — COGNITIVE AND FUNCTIONAL STATUS - GENERAL
STANDING UP FROM CHAIR USING ARMS: A LITTLE
PERSONAL GROOMING: A LITTLE
DRESSING REGULAR UPPER BODY CLOTHING: A LITTLE
WALKING IN HOSPITAL ROOM: A LITTLE
TOILETING: A LITTLE
SUGGESTED CMS G CODE MODIFIER MOBILITY: CJ
CLIMB 3 TO 5 STEPS WITH RAILING: A LITTLE
HELP NEEDED FOR BATHING: A LITTLE
MOBILITY SCORE: 21
SUGGESTED CMS G CODE MODIFIER DAILY ACTIVITY: CK
DRESSING REGULAR LOWER BODY CLOTHING: A LITTLE
DAILY ACTIVITIY SCORE: 19

## 2018-03-09 ASSESSMENT — PAIN SCALES - GENERAL
PAINLEVEL_OUTOF10: 0
PAINLEVEL_OUTOF10: 0

## 2018-03-09 ASSESSMENT — GAIT ASSESSMENTS
DISTANCE (FEET): 100
ASSISTIVE DEVICE: FRONT WHEEL WALKER
GAIT LEVEL OF ASSIST: STAND BY ASSIST
DEVIATION: STEP TO;DECREASED BASE OF SUPPORT;DECREASED HEEL STRIKE;DECREASED TOE OFF

## 2018-03-09 ASSESSMENT — ENCOUNTER SYMPTOMS
SHORTNESS OF BREATH: 0
BACK PAIN: 0
DIZZINESS: 0
BLURRED VISION: 0
HEADACHES: 0
FEVER: 0
CHILLS: 0
FOCAL WEAKNESS: 0
NAUSEA: 0
SPUTUM PRODUCTION: 0
DOUBLE VISION: 0
COUGH: 0
WEAKNESS: 0
ABDOMINAL PAIN: 0
VOMITING: 0

## 2018-03-09 ASSESSMENT — ACTIVITIES OF DAILY LIVING (ADL): TOILETING: INDEPENDENT

## 2018-03-09 NOTE — PROGRESS NOTES
Received patient from ICU via stretcher.  Skin warm, dry and intact with bruises to arms and blanchable redness to sacral area.  Skin assessed by 2 nurses.

## 2018-03-09 NOTE — CARE PLAN
Problem: Fluid Volume:  Goal: Will maintain balanced intake and output  Outcome: PROGRESSING AS EXPECTED  Pt with good PO intake. Feeds self well. Consuming more than 75% of each meal. Provide patient with extra water and juice PRN.     Problem: Psychosocial Needs:  Goal: Level of anxiety will decrease  Outcome: PROGRESSING AS EXPECTED  Pt given frequent updated in POC. A&Ox4 for this shift, pleasant and cooperative. History of some confusion and agitation, none noted since start of this shift. See psych note 3/8/18.    Problem: Mobility  Goal: Risk for activity intolerance will decrease  Outcome: PROGRESSING AS EXPECTED  Pt ambulating with standby assistance to bedside commode. Gait steady. Skin intact. Turns self from side to side in bed.

## 2018-03-09 NOTE — DISCHARGE PLANNING
Medical Social Worker    SW left message for pt's daughter requesting a call back to discuss DC Planning.
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
## 2018-03-09 NOTE — CARE PLAN
Problem: Communication  Goal: The ability to communicate needs accurately and effectively will improve  Outcome: PROGRESSING AS EXPECTED  Alert and oriented x 4, able to communicate needs to staff.    Problem: Mobility  Goal: Risk for activity intolerance will decrease  Outcome: PROGRESSING AS EXPECTED  Ambulates in room with standby assistance.

## 2018-03-09 NOTE — PROGRESS NOTES
Neurosurgery Progress Note    Subjective:  No changes.  Anxious to go home    Exam:  Oriented x4. Tongue ML  No drift.  Moves all 4    BP  Min: 110/53  Max: 119/45  Pulse  Av.8  Min: 63  Max: 100  Resp  Av.9  Min: 15  Max: 22  Temp  Av.9 °C (98.5 °F)  Min: 36.6 °C (97.8 °F)  Max: 37.4 °C (99.3 °F)  SpO2  Av.9 %  Min: 92 %  Max: 96 %    No Data Recorded    Recent Labs      18   0551  18   0540  18   0522   WBC  5.6  5.8  6.1   RBC  4.50  4.52  4.56   HEMOGLOBIN  13.8  13.6  13.8   HEMATOCRIT  42.5  42.0  42.7   MCV  94.4  92.9  93.6   MCH  30.7  30.1  30.3   MCHC  32.5*  32.4*  32.3*   RDW  49.7  48.2  49.1   PLATELETCT  179  202  181   MPV  10.6  10.3  10.5     Recent Labs      18   0552  18   0540  18   0522   SODIUM  138  140  139   POTASSIUM  3.7  3.6  4.0   CHLORIDE  106  105  107   CO2  24  25  25   GLUCOSE  101*  107*  97   BUN  15  19  13               Intake/Output       18 07 - 18 0659 18 07 - 03/10/18 0659       Total  Total       Intake    P.O.  --  340 340  --  -- --    P.O. -- 340 340 -- -- --    Total Intake -- 340 340 -- -- --       Output    Urine  300  -- 300  --  -- --    Number of Times Voided -- 2 x 2 x -- -- --    Void (ml) 300 -- 300 -- -- --    Total Output 300 -- 300 -- -- --       Net I/O     -300 340 40 -- -- --            Intake/Output Summary (Last 24 hours) at 18 1017  Last data filed at 18 0400   Gross per 24 hour   Intake              340 ml   Output                0 ml   Net              340 ml            • haloperidol  2 mg TID   • HYDROmorphone  0.5 mg Q3HRS PRN   • Divalproex Sodium  250 mg Q12HRS   • LORazepam  0.5 mg Q6HRS PRN   • haloperidol lactate  5 mg Q4HRS PRN   • cloNIDine  0.1 mg Q4HRS PRN   • hydrALAZINE  10-20 mg Q4HRS PRN   • senna-docusate  2 Tab BID    And   • magnesium hydroxide  30 mL QDAY PRN    And   • bisacodyl  10 mg QDAY PRN   •  acetaminophen  650 mg Q6HRS PRN   • lisinopril  20 mg DAILY   • niMODipine  60 mg Q4HRS   • Pharmacy Consult Request  1 Each PRN   • oxyCODONE immediate-release  5 mg Q3HRS PRN   • oxyCODONE immediate release  10 mg Q3HRS PRN   • MD ALERT...Do not administer NSAIDS or ASPIRIN unless ORDERED By Neurosurgery  1 Each PRN   • ondansetron  4 mg Q4HRS PRN   • dexamethasone  4 mg Once PRN   • diphenhydrAMINE  25 mg Q6HRS PRN   • scopolamine  1 Patch Q72HRS PRN   • labetalol  10 mg Q10 MIN PRN   • docusate sodium  100 mg BID   • polyethylene glycol/lytes  1 Packet BID PRN   • simvastatin  20 mg Q EVENING       Assessment and Plan:  Hospital day #10  Prophylactic anticoagulation: no  Ok out of ICU  Continue TCDs stable  Continue nimodipine  Continue Depakote- 3-6 mon    Radiologist and Dr Noguera discussed case.  Rad felt to early for second angiogram, Monday for angiogram to be performed. Keep in house until that time

## 2018-03-09 NOTE — PROGRESS NOTES
Pt to transfer to [place] [address] bed 1.  Report given to Cedric Foster.  Pt daughter notified of new room.  Pt transferred with belongings.

## 2018-03-09 NOTE — THERAPY
"Physical Therapy Evaluation completed.   Bed Mobility:  Supine to Sit: Stand by Assist  Transfers: Sit to Stand: Stand by Assist  Gait: Level Of Assist: Stand by Assist with Front-Wheel Walker       Plan of Care: Will benefit from Physical Therapy 3 times per week  Discharge Recommendations: Equipment: Front-Wheel Walker.    See \"Rehab Therapy-Acute\" Patient Summary Report for complete documentation.     RN notified of PT visit, cleared for PT evaluation. Pt. presented to PT for primary risk reduction for LOB/falling. Pt. presents with imparied balance, impaired gait, general weakness, deconditioning, and decreased activity tolerance. Pt. was able to demonstrate CGA to SBA for all functional mobilty at this time w/FWW. Pt. demonstrated with 1 oliva LOB when attempting to scratch her nose during ambulation and demonstrated with L lateral lean, however was able to self correct. Pt. was alert and oriented throughout session and no apparent confusion. Pt. presented with decreased activity tolerance as the pt. demonstrated with SOB and increased WOB with activity and required multiple rest breaks throughout session. Pt. will benefit from continued skilled PT while in house with recommendation for post acute therapy services prior to d/c given current objective findings, age, IPLOF, enviornmental barries and limited social support at home.   "

## 2018-03-09 NOTE — PROGRESS NOTES
"Procedure Requested: CEREBRAL ANGIOGRAM  Date of request: 3/8/18  Date of consultation: 3/8/18  Requesting Provider: DR. KNOWLES / VERONA LIVE    Summary:  RECENT SAH, CTA SHOWED SUSPICION OF LEFT MCA SYLVIAN BR 4MM ANEURYSM.     CEREBRAL ANGIO 3/1/18: L MCA CHRONIC M1 OCCLUSION WITH COLLATERALS \"GALLAGHER-GALLAGHER\" LIKE WHICH COULD EXPLAIN SAH. NO ANEURYSM SEEN    Imaging Findings:  SEE ABOVE    Interventional Radiology Recommendation:  DISCUSSED WITH DR. KNOWLES, REQUESTING F/U CEREBRAL ANGIO PRIOR TO DC HOME (7-14 DAYS AFTER INITIAL ANGIOGRAM).   WE WILL NOT DO CEREBRAL ANGIO TODAY BUT WILL PLAN FOR ANGIO EARLY NEXT WEEK PROB AROUND 3-13-18 OR 3-14-18    "

## 2018-03-10 LAB
CHOLEST SERPL-MCNC: 147 MG/DL (ref 100–199)
HDLC SERPL-MCNC: 56 MG/DL
LDLC SERPL CALC-MCNC: 74 MG/DL
TRIGL SERPL-MCNC: 85 MG/DL (ref 0–149)

## 2018-03-10 PROCEDURE — A9270 NON-COVERED ITEM OR SERVICE: HCPCS | Performed by: PSYCHIATRY & NEUROLOGY

## 2018-03-10 PROCEDURE — 700102 HCHG RX REV CODE 250 W/ 637 OVERRIDE(OP): Performed by: INTERNAL MEDICINE

## 2018-03-10 PROCEDURE — 36415 COLL VENOUS BLD VENIPUNCTURE: CPT

## 2018-03-10 PROCEDURE — 700102 HCHG RX REV CODE 250 W/ 637 OVERRIDE(OP): Performed by: PSYCHIATRY & NEUROLOGY

## 2018-03-10 PROCEDURE — A9270 NON-COVERED ITEM OR SERVICE: HCPCS | Performed by: STUDENT IN AN ORGANIZED HEALTH CARE EDUCATION/TRAINING PROGRAM

## 2018-03-10 PROCEDURE — 80061 LIPID PANEL: CPT

## 2018-03-10 PROCEDURE — 93886 INTRACRANIAL COMPLETE STUDY: CPT

## 2018-03-10 PROCEDURE — A9270 NON-COVERED ITEM OR SERVICE: HCPCS | Performed by: INTERNAL MEDICINE

## 2018-03-10 PROCEDURE — 700102 HCHG RX REV CODE 250 W/ 637 OVERRIDE(OP): Performed by: STUDENT IN AN ORGANIZED HEALTH CARE EDUCATION/TRAINING PROGRAM

## 2018-03-10 PROCEDURE — 770006 HCHG ROOM/CARE - MED/SURG/GYN SEMI*

## 2018-03-10 PROCEDURE — 99231 SBSQ HOSP IP/OBS SF/LOW 25: CPT | Mod: GC | Performed by: HOSPITALIST

## 2018-03-10 RX ADMIN — HALOPERIDOL 2 MG: 2 TABLET ORAL at 19:52

## 2018-03-10 RX ADMIN — LISINOPRIL 20 MG: 20 TABLET ORAL at 11:49

## 2018-03-10 RX ADMIN — NIMODIPINE 60 MG: 30 CAPSULE ORAL at 11:51

## 2018-03-10 RX ADMIN — DIVALPROEX SODIUM 250 MG: 125 CAPSULE, COATED PELLETS ORAL at 19:52

## 2018-03-10 RX ADMIN — DIVALPROEX SODIUM 250 MG: 125 CAPSULE, COATED PELLETS ORAL at 11:48

## 2018-03-10 RX ADMIN — NIMODIPINE 60 MG: 30 CAPSULE ORAL at 04:22

## 2018-03-10 RX ADMIN — NIMODIPINE 60 MG: 30 CAPSULE ORAL at 19:52

## 2018-03-10 RX ADMIN — SIMVASTATIN 20 MG: 20 TABLET, FILM COATED ORAL at 19:52

## 2018-03-10 RX ADMIN — ACETAMINOPHEN 650 MG: 325 TABLET, FILM COATED ORAL at 14:04

## 2018-03-10 RX ADMIN — HALOPERIDOL 2 MG: 2 TABLET ORAL at 11:51

## 2018-03-10 RX ADMIN — HALOPERIDOL 2 MG: 2 TABLET ORAL at 16:04

## 2018-03-10 ASSESSMENT — ENCOUNTER SYMPTOMS
LOSS OF CONSCIOUSNESS: 0
EYE DISCHARGE: 0
SINUS PAIN: 0
FOCAL WEAKNESS: 0
CHILLS: 0
FLANK PAIN: 0
VOMITING: 0
CONSTIPATION: 0
DIARRHEA: 0
COUGH: 0
DIZZINESS: 0
ABDOMINAL PAIN: 0
BLURRED VISION: 1
PALPITATIONS: 0
NAUSEA: 0
DEPRESSION: 0
WHEEZING: 0
WEAKNESS: 0
TINGLING: 0
DOUBLE VISION: 0
SORE THROAT: 0
FEVER: 0
BACK PAIN: 0
BLURRED VISION: 0
SPUTUM PRODUCTION: 0
HEARTBURN: 0
SHORTNESS OF BREATH: 0
HEADACHES: 0
EYE PAIN: 0

## 2018-03-10 ASSESSMENT — PAIN SCALES - GENERAL
PAINLEVEL_OUTOF10: 0
PAINLEVEL_OUTOF10: 1
PAINLEVEL_OUTOF10: 0
PAINLEVEL_OUTOF10: 0

## 2018-03-10 NOTE — PROGRESS NOTES
Internal Medicine Interval Note  Note Author: Kevin Sahni M.D.     Name Molly Awan 1945   Age/Sex 72 y.o. female   MRN [id number]   Code Status DNR      After 5PM or if no immediate response to page, please call for cross-coverage  Attending/Team: Dr Delilah Taylor  See Patient List for primary contact information  Call [phone] to page    1st Call - Day Intern (R1):   Dr Cortes  2nd Call - Day Sr. Resident (R2/R3):   Dr Spence          Reason for interval visit  (Principal Problem)   Subarachnoid hemorrhage (CMS-[facility])    Interval Problem Daily Status Update  (24 hours)   Patient evaluated at bedside and found alert and in no acute distress.   No acute events overnight   Post SAH Day 10, BP at goal.  Patient oriented to person, place and time. She denies headache, changes in vision, dizziness.   MMSE /     Psychiatry evaluated the patient and determined that she does not have capacity.   Pt is NOT capacitated to make the decision to leave AMA. Per Psych.     Review of Systems   Constitutional: Negative for chills, fever and malaise/fatigue.   HENT: Negative for congestion.    Eyes: Negative for blurred vision and double vision.   Respiratory: Negative for cough, sputum production and shortness of breath.    Cardiovascular: Negative for chest pain and leg swelling.   Gastrointestinal: Negative for abdominal pain, nausea and vomiting.   Genitourinary: Negative for dysuria.   Musculoskeletal: Negative for back pain.   Skin: Negative for rash.   Neurological: Negative for dizziness, focal weakness, weakness and headaches.       Consultants/Specialty  Neurosurgery  PMA  Psychiatry    Disposition  Continue inpatient monitoring - Angiogram on Monday     Quality Measures  Quality-Core Measures   Reviewed items::  Labs reviewed, Medications reviewed and Radiology images reviewed  Garcia catheter::  No Garcia  DVT prophylaxis pharmacological::  Contraindicated - High bleeding  risk  DVT prophylaxis - mechanical:  SCDs  Ulcer Prophylaxis::  Yes          Physical Exam       Vitals:    03/09/18 0400 03/09/18 0800 03/09/18 1200 03/09/18 1600   BP: 110/53 115/65 115/53    Pulse: 93 100 83    Resp: 20 16 16    Temp: 36.7 °C (98 °F) 36.6 °C (97.8 °F) 37.1 °C (98.8 °F) 36.7 °C (98.1 °F)   SpO2: 93% 93% 94%    Weight:       Height:         Body mass index is 37.02 kg/m².    Oxygen Therapy:  Pulse Oximetry: 94 %, O2 (LPM): 0, O2 Delivery: None (Room Air)    Physical Exam   Constitutional: She is oriented to person, place, and time and well-developed, well-nourished, and in no distress.   HENT:   Head: Normocephalic and atraumatic.   Eyes: Conjunctivae and EOM are normal. Pupils are equal, round, and reactive to light. Right eye exhibits no discharge. Left eye exhibits no discharge.   Neck: Normal range of motion. Neck supple.   Cardiovascular: Normal rate, regular rhythm, normal heart sounds and intact distal pulses.    Pulmonary/Chest: Effort normal and breath sounds normal. No respiratory distress. She has no wheezes.   Abdominal: Soft. Bowel sounds are normal. She exhibits no distension. There is no tenderness.   Musculoskeletal: Normal range of motion. She exhibits no edema.   Neurological: She is alert and oriented to person, place, and time. No cranial nerve deficit. She exhibits normal muscle tone. GCS score is 15.   Strength 5/5 upper and lower extremities   No facial asymmetry   Sensation intact    Skin: Skin is warm. No erythema.   Nursing note and vitals reviewed.        Lab Data Review:         3/9/2018  5:02 PM    Recent Labs      03/07/18   0552  03/08/18   0540  03/09/18   0522   SODIUM  138  140  139   POTASSIUM  3.7  3.6  4.0   CHLORIDE  106  105  107   CO2  24  25  25   BUN  15  19  13   CREATININE  0.61  0.62  0.61   MAGNESIUM  2.1   --    --    CALCIUM  9.2  8.7  9.3       Recent Labs      03/07/18   0552  03/08/18   0540  03/09/18   0522   GLUCOSE  101*  107*  97       Recent  Labs      03/07/18   0551  03/08/18   0540  03/09/18   0522   RBC  4.50  4.52  4.56   HEMOGLOBIN  13.8  13.6  13.8   HEMATOCRIT  42.5  42.0  42.7   PLATELETCT  179  202  181       Recent Labs      03/07/18   0551  03/08/18   0540  03/09/18   0522   WBC  5.6  5.8  6.1   NEUTSPOLYS  47.70  47.00  45.40   LYMPHOCYTES  36.10  37.30  38.60   MONOCYTES  12.30  12.50  13.20   EOSINOPHILS  3.20  2.20  2.10   BASOPHILS  0.50  0.70  0.50           Assessment/Plan     * Subarachnoid hemorrhage (CMS-HCC)- (present on admission)   Assessment & Plan    Plan:  -Neuro checks q2hrs  -Continue nimodipine 60 mg every 4 hours, hydralazine or labetalol PRN to keep a SBP <150.  -Per neurosurgery- repeat angiogram prior to discharge to ensure no aneurysm was missed. - repeat on Monday   -SCDs.   -On aspiration seizure fall precautions          Altered mental status- (present on admission)   Assessment & Plan    - Resolved,.  -Pt A 0X4, this am.  - overnight, agitated wanting to leave AMA last night.  - Daughter who came in from [place] yesterday is concerned patient may not be able to take care of herself if she goes home  - consult to psych placed for capacity evaluation: Patient incapacitated. Can Not make the decision to leave AMA             Cerebral aneurysm   Assessment & Plan    -CTA showed the size of the aneurysm in the left anterior temporal fossa to be 4 mm.   -s/p Angiogram on 3/1, no definitive aneurysm noted.  -As per neurosurgery- to repeat angiogram prior to discharge to ensure no aneurysm is missed    - Repeat angiogram on Monday         Hypokalemia- (present on admission)   Assessment & Plan    -Continue monitoring and replete as needed   -Goal K>4         Hyperlipidemia- (present on admission)   Assessment & Plan    -Continue home simvastatin    - lipid profile - pending         Essential hypertension- (present on admission)   Assessment & Plan    -Admitted for SAH.   -Goal SBP <150.   -Continue home  lisinopril  -Continue nimodipine for SAH.  -On clonidine, hydralazine and labetalol prn.

## 2018-03-10 NOTE — CARE PLAN
Problem: Pain Management  Goal: Pain level will decrease to patient's comfort goal  No pain needs at this time     Problem: Mobility  Goal: Risk for activity intolerance will decrease  Pt is a sba to the restroom. Steady gait

## 2018-03-10 NOTE — PROGRESS NOTES
Assumed care of patient at 0700.  Received report from night RN.  Pt alert and oriented x4, has no complaints of pain. Pt resting comfortably in bed. Bed alarm on. Side rails padded, suction set up intact.

## 2018-03-10 NOTE — PROGRESS NOTES
Neurosurgery Progress Note    Subjective:  No changes.   Resting comfortably    Exam:  Oriented x4. Tongue ML  No drift.  Moves all 4, Stength intact in all extremities  TCD's have been normal    BP  Min: 95/37  Max: 125/56  Pulse  Av.5  Min: 72  Max: 96  Resp  Av  Min: 16  Max: 16  Temp  Av °C (98.6 °F)  Min: 36.5 °C (97.7 °F)  Max: 37.7 °C (99.9 °F)  SpO2  Av.8 %  Min: 91 %  Max: 99 %    No Data Recorded    Recent Labs      18   0540  18   0522   WBC  5.8  6.1   RBC  4.52  4.56   HEMOGLOBIN  13.6  13.8   HEMATOCRIT  42.0  42.7   MCV  92.9  93.6   MCH  30.1  30.3   MCHC  32.4*  32.3*   RDW  48.2  49.1   PLATELETCT  202  181   MPV  10.3  10.5     Recent Labs      18   0540  18   0522   SODIUM  140  139   POTASSIUM  3.6  4.0   CHLORIDE  105  107   CO2  25  25   GLUCOSE  107*  97   BUN  19  13               Intake/Output       18 0700 - 03/10/18 0659 03/10/18 0700 - 18 0659      [id number] 5594-0916 Total 0769-5579 2683-7018 Total       Intake    Total Intake -- -- -- -- -- --       Output    Stool  --  -- --  --  -- --    Number of Times Stooled 1 x -- 1 x -- -- --    Total Output -- -- -- -- -- --       Net I/O     -- -- -- -- -- --          No intake or output data in the 24 hours ending 03/10/18 0828         • haloperidol  2 mg TID   • HYDROmorphone  0.5 mg Q3HRS PRN   • Divalproex Sodium  250 mg Q12HRS   • LORazepam  0.5 mg Q6HRS PRN   • haloperidol lactate  5 mg Q4HRS PRN   • cloNIDine  0.1 mg Q4HRS PRN   • hydrALAZINE  10-20 mg Q4HRS PRN   • senna-docusate  2 Tab BID    And   • magnesium hydroxide  30 mL QDAY PRN    And   • bisacodyl  10 mg QDAY PRN   • acetaminophen  650 mg Q6HRS PRN   • lisinopril  20 mg DAILY   • niMODipine  60 mg Q4HRS   • Pharmacy Consult Request  1 Each PRN   • oxyCODONE immediate-release  5 mg Q3HRS PRN   • oxyCODONE immediate release  10 mg Q3HRS PRN   • MD ALERT...Do not administer NSAIDS or ASPIRIN unless ORDERED By Neurosurgery   1 Each PRN   • ondansetron  4 mg Q4HRS PRN   • dexamethasone  4 mg Once PRN   • diphenhydrAMINE  25 mg Q6HRS PRN   • scopolamine  1 Patch Q72HRS PRN   • labetalol  10 mg Q10 MIN PRN   • docusate sodium  100 mg BID   • polyethylene glycol/lytes  1 Packet BID PRN   • simvastatin  20 mg Q EVENING       Assessment and Plan:  Hospital day #11  Prophylactic anticoagulation: no  Stable exam  Continue TCDs stable  Continue nimodipine  Continue Depakote- 3-6 mon    Radiologist and Dr Noguera discussed case.  Rad felt to early for second angiogram, Monday for angiogram to be performed. Keep in house until that time
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
## 2018-03-10 NOTE — PROGRESS NOTES
Pt is A+Ox4. DIAZ. Denies pain, n/v or n/t. Up sta. BP meds held at time 2/2 hypotension. All questions answered regarding POC.

## 2018-03-11 LAB
ANION GAP SERPL CALC-SCNC: 8 MMOL/L (ref 0–11.9)
BASOPHILS # BLD AUTO: 0.7 % (ref 0–1.8)
BASOPHILS # BLD: 0.05 K/UL (ref 0–0.12)
BUN SERPL-MCNC: 21 MG/DL (ref 8–22)
CALCIUM SERPL-MCNC: 9.2 MG/DL (ref 8.5–10.5)
CHLORIDE SERPL-SCNC: 104 MMOL/L (ref 96–112)
CO2 SERPL-SCNC: 26 MMOL/L (ref 20–33)
CREAT SERPL-MCNC: 0.69 MG/DL (ref 0.5–1.4)
EOSINOPHIL # BLD AUTO: 0.17 K/UL (ref 0–0.51)
EOSINOPHIL NFR BLD: 2.3 % (ref 0–6.9)
ERYTHROCYTE [DISTWIDTH] IN BLOOD BY AUTOMATED COUNT: 49.8 FL (ref 35.9–50)
GLUCOSE SERPL-MCNC: 92 MG/DL (ref 65–99)
HCT VFR BLD AUTO: 41.4 % (ref 37–47)
HGB BLD-MCNC: 13.4 G/DL (ref 12–16)
IMM GRANULOCYTES # BLD AUTO: 0.02 K/UL (ref 0–0.11)
IMM GRANULOCYTES NFR BLD AUTO: 0.3 % (ref 0–0.9)
LYMPHOCYTES # BLD AUTO: 3.42 K/UL (ref 1–4.8)
LYMPHOCYTES NFR BLD: 46 % (ref 22–41)
MCH RBC QN AUTO: 30.2 PG (ref 27–33)
MCHC RBC AUTO-ENTMCNC: 32.4 G/DL (ref 33.6–35)
MCV RBC AUTO: 93.5 FL (ref 81.4–97.8)
MONOCYTES # BLD AUTO: 0.97 K/UL (ref 0–0.85)
MONOCYTES NFR BLD AUTO: 13 % (ref 0–13.4)
NEUTROPHILS # BLD AUTO: 2.81 K/UL (ref 2–7.15)
NEUTROPHILS NFR BLD: 37.7 % (ref 44–72)
NRBC # BLD AUTO: 0 K/UL
NRBC BLD-RTO: 0 /100 WBC
PLATELET # BLD AUTO: 204 K/UL (ref 164–446)
PMV BLD AUTO: 10.3 FL (ref 9–12.9)
POTASSIUM SERPL-SCNC: 4.3 MMOL/L (ref 3.6–5.5)
RBC # BLD AUTO: 4.43 M/UL (ref 4.2–5.4)
SODIUM SERPL-SCNC: 138 MMOL/L (ref 135–145)
WBC # BLD AUTO: 7.4 K/UL (ref 4.8–10.8)

## 2018-03-11 PROCEDURE — A9270 NON-COVERED ITEM OR SERVICE: HCPCS | Performed by: STUDENT IN AN ORGANIZED HEALTH CARE EDUCATION/TRAINING PROGRAM

## 2018-03-11 PROCEDURE — 99232 SBSQ HOSP IP/OBS MODERATE 35: CPT | Mod: GC | Performed by: HOSPITALIST

## 2018-03-11 PROCEDURE — A9270 NON-COVERED ITEM OR SERVICE: HCPCS | Performed by: PSYCHIATRY & NEUROLOGY

## 2018-03-11 PROCEDURE — 85025 COMPLETE CBC W/AUTO DIFF WBC: CPT

## 2018-03-11 PROCEDURE — 700102 HCHG RX REV CODE 250 W/ 637 OVERRIDE(OP): Performed by: STUDENT IN AN ORGANIZED HEALTH CARE EDUCATION/TRAINING PROGRAM

## 2018-03-11 PROCEDURE — 770006 HCHG ROOM/CARE - MED/SURG/GYN SEMI*

## 2018-03-11 PROCEDURE — 93888 INTRACRANIAL LIMITED STUDY: CPT

## 2018-03-11 PROCEDURE — 700102 HCHG RX REV CODE 250 W/ 637 OVERRIDE(OP): Performed by: PSYCHIATRY & NEUROLOGY

## 2018-03-11 PROCEDURE — 700102 HCHG RX REV CODE 250 W/ 637 OVERRIDE(OP): Performed by: INTERNAL MEDICINE

## 2018-03-11 PROCEDURE — A9270 NON-COVERED ITEM OR SERVICE: HCPCS | Performed by: INTERNAL MEDICINE

## 2018-03-11 PROCEDURE — 80048 BASIC METABOLIC PNL TOTAL CA: CPT

## 2018-03-11 PROCEDURE — 36415 COLL VENOUS BLD VENIPUNCTURE: CPT

## 2018-03-11 RX ADMIN — NIMODIPINE 60 MG: 30 CAPSULE ORAL at 17:12

## 2018-03-11 RX ADMIN — HALOPERIDOL 2 MG: 2 TABLET ORAL at 09:19

## 2018-03-11 RX ADMIN — NIMODIPINE 60 MG: 30 CAPSULE ORAL at 12:44

## 2018-03-11 RX ADMIN — HALOPERIDOL 2 MG: 2 TABLET ORAL at 20:27

## 2018-03-11 RX ADMIN — HALOPERIDOL 2 MG: 2 TABLET ORAL at 16:00

## 2018-03-11 RX ADMIN — NIMODIPINE 60 MG: 30 CAPSULE ORAL at 00:00

## 2018-03-11 RX ADMIN — DIVALPROEX SODIUM 250 MG: 125 CAPSULE, COATED PELLETS ORAL at 20:27

## 2018-03-11 RX ADMIN — DIVALPROEX SODIUM 250 MG: 125 CAPSULE, COATED PELLETS ORAL at 09:19

## 2018-03-11 RX ADMIN — LISINOPRIL 20 MG: 20 TABLET ORAL at 09:19

## 2018-03-11 RX ADMIN — SIMVASTATIN 20 MG: 20 TABLET, FILM COATED ORAL at 20:27

## 2018-03-11 RX ADMIN — ACETAMINOPHEN 650 MG: 325 TABLET, FILM COATED ORAL at 00:02

## 2018-03-11 RX ADMIN — NIMODIPINE 60 MG: 30 CAPSULE ORAL at 04:32

## 2018-03-11 ASSESSMENT — ENCOUNTER SYMPTOMS
FOCAL WEAKNESS: 0
COUGH: 0
PALPITATIONS: 0
SPUTUM PRODUCTION: 0
SHORTNESS OF BREATH: 0
DIZZINESS: 0
NAUSEA: 0
BACK PAIN: 0
DEPRESSION: 0
LOSS OF CONSCIOUSNESS: 0
BLURRED VISION: 0
CHILLS: 0
DIARRHEA: 0
ABDOMINAL PAIN: 0
DOUBLE VISION: 0
HEADACHES: 0
FEVER: 0
VOMITING: 0

## 2018-03-11 ASSESSMENT — PAIN SCALES - GENERAL
PAINLEVEL_OUTOF10: 1
PAINLEVEL_OUTOF10: 0
PAINLEVEL_OUTOF10: 0

## 2018-03-11 NOTE — PROGRESS NOTES
Neurosurgery Progress Note    Subjective:  No changes.     Exam:  Oriented x4. Tongue ML  No drift.  Moves all 4, Stength intact in all extremities  TCD's have been normal    BP  Min: 98/60  Max: 108/42  Pulse  Av.3  Min: 65  Max: 94  Resp  Av  Min: 16  Max: 16  Temp  Av.7 °C (98 °F)  Min: 36.1 °C (97 °F)  Max: 37.1 °C (98.7 °F)  SpO2  Av.2 %  Min: 92 %  Max: 94 %    No Data Recorded    Recent Labs      18   0518   0345   WBC  6.1  7.4   RBC  4.56  4.43   HEMOGLOBIN  13.8  13.4   HEMATOCRIT  42.7  41.4   MCV  93.6  93.5   MCH  30.3  30.2   MCHC  32.3*  32.4*   RDW  49.1  49.8   PLATELETCT  181  204   MPV  10.5  10.3     Recent Labs      18   0518   0346   SODIUM  139  138   POTASSIUM  4.0  4.3   CHLORIDE  107  104   CO2  25  26   GLUCOSE  97  92   BUN  13  21               Intake/Output       03/10/18 0700 - 18 0659 18 0700 - 18 0659      [id number] 0520-8451 Total 0696-6032 8436-0868 Total       Intake    Total Intake -- -- -- -- -- --       Output    Stool  --  -- --  --  -- --    Number of Times Stooled 1 x -- 1 x -- -- --    Total Output -- -- -- -- -- --       Net I/O     -- -- -- -- -- --          No intake or output data in the 24 hours ending 18 1206         • haloperidol  2 mg TID   • HYDROmorphone  0.5 mg Q3HRS PRN   • Divalproex Sodium  250 mg Q12HRS   • LORazepam  0.5 mg Q6HRS PRN   • haloperidol lactate  5 mg Q4HRS PRN   • cloNIDine  0.1 mg Q4HRS PRN   • hydrALAZINE  10-20 mg Q4HRS PRN   • senna-docusate  2 Tab BID    And   • magnesium hydroxide  30 mL QDAY PRN    And   • bisacodyl  10 mg QDAY PRN   • acetaminophen  650 mg Q6HRS PRN   • lisinopril  20 mg DAILY   • niMODipine  60 mg Q4HRS   • Pharmacy Consult Request  1 Each PRN   • oxyCODONE immediate-release  5 mg Q3HRS PRN   • oxyCODONE immediate release  10 mg Q3HRS PRN   • MD ALERT...Do not administer NSAIDS or ASPIRIN unless ORDERED By Neurosurgery  1 Each PRN   •  ondansetron  4 mg Q4HRS PRN   • dexamethasone  4 mg Once PRN   • diphenhydrAMINE  25 mg Q6HRS PRN   • scopolamine  1 Patch Q72HRS PRN   • labetalol  10 mg Q10 MIN PRN   • docusate sodium  100 mg BID   • polyethylene glycol/lytes  1 Packet BID PRN   • simvastatin  20 mg Q EVENING       Assessment and Plan:  Hospital day #12  Prophylactic anticoagulation: no  Stable exam  Continue TCDs  Continue nimodipine  Continue Depakote- 3-6 mon    Radiologist and Dr Noguera discussed case.  Rad felt to early for second angiogram, Monday for angiogram to be performed. Keep in house until that time    IR angio order placed. Will discuss order for angio w MD. NPO at midnight. Sips of clears OK w meds until 2 hr prior.

## 2018-03-11 NOTE — CARE PLAN
Problem: Infection  Goal: Will remain free from infection  Educated on hand hygiene     Problem: Mobility  Goal: Risk for activity intolerance will decrease  Pt is up stand by

## 2018-03-11 NOTE — PROGRESS NOTES
Internal Medicine Interval Note  Note Author: Kevin Sahni M.D.     Name Molly Awan 1945   Age/Sex 72 y.o. female   MRN [id number]   Code Status DNR      After 5PM or if no immediate response to page, please call for cross-coverage  Attending/Team: Dr Delilah Taylor  See Patient List for primary contact information  Call [phone] to page    1st Call - Day Intern (R1):   Dr Cortes  2nd Call - Day Sr. Resident (R2/R3):   Dr Spence          Reason for interval visit  (Principal Problem)   Subarachnoid hemorrhage (CMS-[facility])    Interval Problem Daily Status Update  (24 hours)   Patient evaluated at bedside and found alert and in no acute distress.   No acute events overnight   Post SAH Day 12, BP at goal.  NPO at midnight for angiogram in the morning        Psychiatry evaluated the patient and determined that she does not have capacity.   Pt is NOT capacitated to make the decision to leave AMA. Per Psych.     Review of Systems   Constitutional: Negative for chills and fever.   HENT: Negative for congestion.    Eyes: Negative for blurred vision and double vision.   Respiratory: Negative for cough, sputum production and shortness of breath.    Cardiovascular: Negative for chest pain, palpitations and leg swelling.   Gastrointestinal: Negative for abdominal pain, diarrhea, nausea and vomiting.   Genitourinary: Negative for dysuria and urgency.   Musculoskeletal: Negative for back pain.   Skin: Negative for itching and rash.   Neurological: Negative for dizziness, focal weakness, loss of consciousness and headaches.   Psychiatric/Behavioral: Negative for depression.       Consultants/Specialty  Neurosurgery  PMA  Psychiatry     Disposition  Continue inpatient monitoring     Quality Measures  Quality-Core Measures   Reviewed items::  Labs reviewed and Medications reviewed  Garcia catheter::  No Garcia  DVT prophylaxis pharmacological::  Contraindicated - High bleeding  risk          Physical Exam       Vitals:    03/11/18 0000 03/11/18 0400 03/11/18 0432 03/11/18 0800   BP: 106/44 108/42 108/42 100/41   Pulse: 79 69  65   Resp:  16  16   Temp:  36.1 °C (97 °F)  37.1 °C (98.7 °F)   SpO2:  92%  93%   Weight:       Height:         Body mass index is 37.02 kg/m².    Oxygen Therapy:  Pulse Oximetry: 93 %, O2 (LPM): 0, O2 Delivery: None (Room Air)    Physical Exam   Constitutional: She is oriented to person, place, and time and well-developed, well-nourished, and in no distress.   HENT:   Head: Normocephalic and atraumatic.   Eyes: Conjunctivae and EOM are normal. Pupils are equal, round, and reactive to light. Right eye exhibits no discharge. Left eye exhibits no discharge. No scleral icterus.   Neck: Normal range of motion. Neck supple.   Cardiovascular: Normal rate and normal heart sounds.    Pulmonary/Chest: Effort normal and breath sounds normal. No respiratory distress. She has no wheezes.   Abdominal: Soft. Bowel sounds are normal. She exhibits no distension. There is no tenderness.   Musculoskeletal: Normal range of motion. She exhibits no edema.   Neurological: She is alert and oriented to person, place, and time. No cranial nerve deficit.   Skin: Skin is warm.   Psychiatric: Affect normal.   Nursing note and vitals reviewed.      Lab Data Review:         3/10/2018  6:46 PM    Recent Labs      03/09/18   0522 03/11/18   0346   SODIUM  139  138   POTASSIUM  4.0  4.3   CHLORIDE  107  104   CO2  25  26   BUN  13  21   CREATININE  0.61  0.69   CALCIUM  9.3  9.2       Recent Labs      03/09/18   0522  03/11/18   0346   GLUCOSE  97  92       Recent Labs      03/09/18   0522  03/11/18   0345   RBC  4.56  4.43   HEMOGLOBIN  13.8  13.4   HEMATOCRIT  42.7  41.4   PLATELETCT  181  204       Recent Labs      03/09/18   0522  03/11/18   0345   WBC  6.1  7.4   NEUTSPOLYS  45.40  37.70*   LYMPHOCYTES  38.60  46.00*   MONOCYTES  13.20  13.00   EOSINOPHILS  2.10  2.30   BASOPHILS  0.50  0.70            Assessment/Plan     * Subarachnoid hemorrhage (CMS-HCC)- (present on admission)   Assessment & Plan    Plan:  -Neuro checks q2hrs  -Continue nimodipine 60 mg every 4 hours, hydralazine or labetalol PRN to keep a SBP <150.  -Per neurosurgery- repeat angiogram prior to discharge to ensure no aneurysm was missed. - repeat on Monday   -SCDs.   -On aspiration seizure fall precautions    - NPO at midnight for angiogram in the morning 3/12/18        Altered mental status- (present on admission)   Assessment & Plan    - Resolved,.  -Pt A 0X4, this am.  - overnight, agitated wanting to leave AMA last night.  - Daughter who came in from [place] yesterday is concerned patient may not be able to take care of herself if she goes home  - consult to psych placed for capacity evaluation: Patient incapacitated. Can Not make the decision to leave AMA             Cerebral aneurysm   Assessment & Plan    -CTA showed the size of the aneurysm in the left anterior temporal fossa to be 4 mm.   -s/p Angiogram on 3/1, no definitive aneurysm noted.  -As per neurosurgery- to repeat angiogram prior to discharge to ensure no aneurysm is missed    - Repeat angiogram on Monday         Hypokalemia- (present on admission)   Assessment & Plan    -Continue monitoring and replete as needed   -Goal K>4         Hyperlipidemia- (present on admission)   Assessment & Plan    -Continue home simvastatin    - lipid profile - normal         Essential hypertension- (present on admission)   Assessment & Plan    -Admitted for SAH.   -Goal SBP <150.   -Continue home lisinopril  -Continue nimodipine for SAH.  -On clonidine, hydralazine and labetalol prn.

## 2018-03-11 NOTE — PROGRESS NOTES
Internal Medicine Interval Note  Note Author: Kevin Sahni M.D.     Name Molly Awan 1945   Age/Sex 72 y.o. female   MRN [id number]   Code Status DNR      After 5PM or if no immediate response to page, please call for cross-coverage  Attending/Team: Dr Delilah Taylor  See Patient List for primary contact information  Call [phone] to page    1st Call - Day Intern (R1):   Dr Cortes  2nd Call - Day Sr. Resident (R2/R3):   Dr Spence          Reason for interval visit  (Principal Problem)   Subarachnoid hemorrhage (CMS-[facility])    Interval Problem Daily Status Update  (24 hours)   Patient evaluated at bedside and found alert and in no acute distress.   No acute events overnight   Post SAH Day 11, BP at goal.  Patient oriented to person, place and time. She denies headache, changes in vision, dizziness.   Waiting for repeat angiogram     Psychiatry evaluated the patient and determined that she does not have capacity.   Pt is NOT capacitated to make the decision to leave AMA. Per Psych.     Review of Systems   Constitutional: Negative for chills and fever.   HENT: Negative for congestion.    Eyes: Negative for blurred vision and double vision.   Respiratory: Negative for cough, sputum production and shortness of breath.    Cardiovascular: Negative for chest pain, palpitations and leg swelling.   Gastrointestinal: Negative for abdominal pain, diarrhea, nausea and vomiting.   Genitourinary: Negative for dysuria and urgency.   Musculoskeletal: Negative for back pain.   Skin: Negative for itching and rash.   Neurological: Negative for dizziness, focal weakness, loss of consciousness and headaches.   Psychiatric/Behavioral: Negative for depression.       Consultants/Specialty  Neurosurgery  PMA  Psychiatry     Disposition  Continue inpatient monitoring     Quality Measures  Quality-Core Measures   Reviewed items::  Labs reviewed and Medications reviewed  Garcia catheter::  No Garcia  DVT  prophylaxis pharmacological::  Contraindicated - High bleeding risk          Physical Exam       Vitals:    03/10/18 0422 03/10/18 0800 03/10/18 1100 03/10/18 1600   BP: 125/56 (!) 97/56 117/45 (!) 98/60   Pulse: 72 79 75 86   Resp: 16 14  16   Temp: 36.5 °C (97.7 °F) 37.1 °C (98.8 °F)  37 °C (98.6 °F)   SpO2: 99% 92%  94%   Weight:       Height:         Body mass index is 37.02 kg/m².    Oxygen Therapy:  Pulse Oximetry: 94 %, O2 (LPM): 0, O2 Delivery: None (Room Air)    Physical Exam   Constitutional: She is oriented to person, place, and time and well-developed, well-nourished, and in no distress.   HENT:   Head: Normocephalic and atraumatic.   Eyes: Conjunctivae and EOM are normal. Pupils are equal, round, and reactive to light. Right eye exhibits no discharge. Left eye exhibits no discharge. No scleral icterus.   Neck: Normal range of motion. Neck supple.   Cardiovascular: Normal rate and normal heart sounds.    Pulmonary/Chest: Effort normal and breath sounds normal. No respiratory distress. She has no wheezes.   Abdominal: Soft. Bowel sounds are normal. She exhibits no distension. There is no tenderness.   Musculoskeletal: Normal range of motion. She exhibits no edema.   Neurological: She is alert and oriented to person, place, and time. No cranial nerve deficit.   Skin: Skin is warm.   Psychiatric: Affect normal.   Nursing note and vitals reviewed.      Lab Data Review:         3/10/2018  6:46 PM    Recent Labs      03/08/18 0540  03/09/18 0522   SODIUM  140  139   POTASSIUM  3.6  4.0   CHLORIDE  105  107   CO2  25  25   BUN  19  13   CREATININE  0.62  0.61   CALCIUM  8.7  9.3       Recent Labs      03/08/18   0540  03/09/18   0522   GLUCOSE  107*  97       Recent Labs      03/08/18   0540  03/09/18   0522   RBC  4.52  4.56   HEMOGLOBIN  13.6  13.8   HEMATOCRIT  42.0  42.7   PLATELETCT  202  181       Recent Labs      03/08/18   0540  03/09/18 0522   WBC  5.8  6.1   NEUTSPOLYS  47.00  45.40    LYMPHOCYTES  37.30  38.60   MONOCYTES  12.50  13.20   EOSINOPHILS  2.20  2.10   BASOPHILS  0.70  0.50           Assessment/Plan     * Subarachnoid hemorrhage (CMS-HCC)- (present on admission)   Assessment & Plan    Plan:  -Neuro checks q2hrs  -Continue nimodipine 60 mg every 4 hours, hydralazine or labetalol PRN to keep a SBP <150.  -Per neurosurgery- repeat angiogram prior to discharge to ensure no aneurysm was missed. - repeat on Monday   -SCDs.   -On aspiration seizure fall precautions          Altered mental status- (present on admission)   Assessment & Plan    - Resolved,.  -Pt A 0X4, this am.  - overnight, agitated wanting to leave AMA last night.  - Daughter who came in from [place] yesterday is concerned patient may not be able to take care of herself if she goes home  - consult to psych placed for capacity evaluation: Patient incapacitated. Can Not make the decision to leave AMA             Cerebral aneurysm   Assessment & Plan    -CTA showed the size of the aneurysm in the left anterior temporal fossa to be 4 mm.   -s/p Angiogram on 3/1, no definitive aneurysm noted.  -As per neurosurgery- to repeat angiogram prior to discharge to ensure no aneurysm is missed    - Repeat angiogram on Monday         Hypokalemia- (present on admission)   Assessment & Plan    -Continue monitoring and replete as needed   -Goal K>4         Hyperlipidemia- (present on admission)   Assessment & Plan    -Continue home simvastatin    - lipid profile - normal         Essential hypertension- (present on admission)   Assessment & Plan    -Admitted for SAH.   -Goal SBP <150.   -Continue home lisinopril  -Continue nimodipine for SAH.  -On clonidine, hydralazine and labetalol prn.

## 2018-03-11 NOTE — PROGRESS NOTES
Pt A+Ox4. DIAZ. Tylenol given for HA. Denies n/t or n/v. Neurological status remains the same. Up stand by. All questions answered regarding POC.

## 2018-03-12 ENCOUNTER — APPOINTMENT (OUTPATIENT)
Dept: RADIOLOGY | Facility: MEDICAL CENTER | Age: 73
DRG: 064 | End: 2018-03-12
Attending: NURSE PRACTITIONER
Payer: MEDICARE

## 2018-03-12 PROCEDURE — A9270 NON-COVERED ITEM OR SERVICE: HCPCS | Performed by: PSYCHIATRY & NEUROLOGY

## 2018-03-12 PROCEDURE — 770006 HCHG ROOM/CARE - MED/SURG/GYN SEMI*

## 2018-03-12 PROCEDURE — 99233 SBSQ HOSP IP/OBS HIGH 50: CPT | Mod: GC | Performed by: HOSPITALIST

## 2018-03-12 PROCEDURE — 99153 MOD SED SAME PHYS/QHP EA: CPT

## 2018-03-12 PROCEDURE — 700102 HCHG RX REV CODE 250 W/ 637 OVERRIDE(OP): Performed by: INTERNAL MEDICINE

## 2018-03-12 PROCEDURE — 700102 HCHG RX REV CODE 250 W/ 637 OVERRIDE(OP): Performed by: STUDENT IN AN ORGANIZED HEALTH CARE EDUCATION/TRAINING PROGRAM

## 2018-03-12 PROCEDURE — 700102 HCHG RX REV CODE 250 W/ 637 OVERRIDE(OP): Performed by: PSYCHIATRY & NEUROLOGY

## 2018-03-12 PROCEDURE — 93888 INTRACRANIAL LIMITED STUDY: CPT | Mod: 26 | Performed by: SURGERY

## 2018-03-12 PROCEDURE — A9270 NON-COVERED ITEM OR SERVICE: HCPCS | Performed by: STUDENT IN AN ORGANIZED HEALTH CARE EDUCATION/TRAINING PROGRAM

## 2018-03-12 PROCEDURE — 700117 HCHG RX CONTRAST REV CODE 255: Performed by: RADIOLOGY

## 2018-03-12 PROCEDURE — A9270 NON-COVERED ITEM OR SERVICE: HCPCS | Performed by: INTERNAL MEDICINE

## 2018-03-12 PROCEDURE — B3181ZZ FLUOROSCOPY OF BILATERAL INTERNAL CAROTID ARTERIES USING LOW OSMOLAR CONTRAST: ICD-10-PCS | Performed by: RADIOLOGY

## 2018-03-12 PROCEDURE — 700111 HCHG RX REV CODE 636 W/ 250 OVERRIDE (IP): Performed by: RADIOLOGY

## 2018-03-12 PROCEDURE — 700111 HCHG RX REV CODE 636 W/ 250 OVERRIDE (IP)

## 2018-03-12 PROCEDURE — 93888 INTRACRANIAL LIMITED STUDY: CPT

## 2018-03-12 RX ORDER — HYDRALAZINE HYDROCHLORIDE 20 MG/ML
10-20 INJECTION INTRAMUSCULAR; INTRAVENOUS EVERY 4 HOURS PRN
Status: DISCONTINUED | OUTPATIENT
Start: 2018-03-12 | End: 2018-03-13 | Stop reason: HOSPADM

## 2018-03-12 RX ORDER — CLONIDINE HYDROCHLORIDE 0.1 MG/1
0.1 TABLET ORAL EVERY 4 HOURS PRN
Status: DISCONTINUED | OUTPATIENT
Start: 2018-03-12 | End: 2018-03-13 | Stop reason: HOSPADM

## 2018-03-12 RX ORDER — ONDANSETRON 2 MG/ML
4 INJECTION INTRAMUSCULAR; INTRAVENOUS PRN
Status: ACTIVE | OUTPATIENT
Start: 2018-03-12 | End: 2018-03-12

## 2018-03-12 RX ORDER — MIDAZOLAM HYDROCHLORIDE 1 MG/ML
.5-2 INJECTION INTRAMUSCULAR; INTRAVENOUS PRN
Status: ACTIVE | OUTPATIENT
Start: 2018-03-12 | End: 2018-03-12

## 2018-03-12 RX ORDER — MIDAZOLAM HYDROCHLORIDE 1 MG/ML
INJECTION INTRAMUSCULAR; INTRAVENOUS
Status: COMPLETED
Start: 2018-03-12 | End: 2018-03-12

## 2018-03-12 RX ORDER — SODIUM CHLORIDE 9 MG/ML
500 INJECTION, SOLUTION INTRAVENOUS
Status: ACTIVE | OUTPATIENT
Start: 2018-03-12 | End: 2018-03-12

## 2018-03-12 RX ADMIN — ACETAMINOPHEN 650 MG: 325 TABLET, FILM COATED ORAL at 11:53

## 2018-03-12 RX ADMIN — IOHEXOL 70 ML: 300 INJECTION, SOLUTION INTRAVENOUS at 18:41

## 2018-03-12 RX ADMIN — SIMVASTATIN 20 MG: 20 TABLET, FILM COATED ORAL at 20:56

## 2018-03-12 RX ADMIN — HALOPERIDOL 2 MG: 2 TABLET ORAL at 16:31

## 2018-03-12 RX ADMIN — MIDAZOLAM 1 MG: 1 INJECTION INTRAMUSCULAR; INTRAVENOUS at 18:07

## 2018-03-12 RX ADMIN — NIMODIPINE 60 MG: 30 CAPSULE ORAL at 16:29

## 2018-03-12 RX ADMIN — DIVALPROEX SODIUM 250 MG: 125 CAPSULE, COATED PELLETS ORAL at 20:57

## 2018-03-12 RX ADMIN — ACETAMINOPHEN 650 MG: 325 TABLET, FILM COATED ORAL at 20:56

## 2018-03-12 RX ADMIN — NIMODIPINE 60 MG: 30 CAPSULE ORAL at 21:13

## 2018-03-12 RX ADMIN — MIDAZOLAM 1 MG: 1 INJECTION INTRAMUSCULAR; INTRAVENOUS at 17:56

## 2018-03-12 RX ADMIN — FENTANYL CITRATE 25 MCG: 50 INJECTION, SOLUTION INTRAMUSCULAR; INTRAVENOUS at 18:27

## 2018-03-12 RX ADMIN — HALOPERIDOL 2 MG: 2 TABLET ORAL at 20:56

## 2018-03-12 RX ADMIN — HALOPERIDOL 2 MG: 2 TABLET ORAL at 09:13

## 2018-03-12 RX ADMIN — LISINOPRIL 20 MG: 20 TABLET ORAL at 10:22

## 2018-03-12 RX ADMIN — NIMODIPINE 60 MG: 30 CAPSULE ORAL at 12:49

## 2018-03-12 RX ADMIN — FENTANYL CITRATE 25 MCG: 50 INJECTION, SOLUTION INTRAMUSCULAR; INTRAVENOUS at 18:05

## 2018-03-12 RX ADMIN — DIVALPROEX SODIUM 250 MG: 125 CAPSULE, COATED PELLETS ORAL at 09:13

## 2018-03-12 RX ADMIN — NIMODIPINE 60 MG: 30 CAPSULE ORAL at 09:13

## 2018-03-12 ASSESSMENT — PAIN SCALES - GENERAL
PAINLEVEL_OUTOF10: 5
PAINLEVEL_OUTOF10: 0
PAINLEVEL_OUTOF10: 3
PAINLEVEL_OUTOF10: 5
PAINLEVEL_OUTOF10: 2
PAINLEVEL_OUTOF10: 2

## 2018-03-12 ASSESSMENT — ENCOUNTER SYMPTOMS
DIARRHEA: 0
BLURRED VISION: 0
NAUSEA: 0
DOUBLE VISION: 0
SPUTUM PRODUCTION: 0
LOSS OF CONSCIOUSNESS: 0
CHILLS: 0
FEVER: 0
DEPRESSION: 0
DIZZINESS: 0
COUGH: 0
HEADACHES: 0
SHORTNESS OF BREATH: 0
WEAKNESS: 0
ABDOMINAL PAIN: 0
BACK PAIN: 0
PALPITATIONS: 0
VOMITING: 0
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
## 2018-03-12 NOTE — DISCHARGE PLANNING
Medical Social Worker    JADE requested that [facility] please resend updated referrals to all SNFs as pt has been declined due to behaviors which are no longer occurring as pt is no longer disoriented.

## 2018-03-12 NOTE — DISCHARGE PLANNING
Received notice from [facility], patient has been denied 2nd time for: Behaviors and no open beds.

## 2018-03-12 NOTE — PROGRESS NOTES
Internal Medicine Interval Note  Note Author: Kevin Sahni M.D.     Name Molly Awan 1945   Age/Sex 72 y.o. female   MRN [id number]   Code Status DNR      After 5PM or if no immediate response to page, please call for cross-coverage  Attending/Team: Dr Solo Taylor  See Patient List for primary contact information  Call [phone] to page    1st Call - Day Intern (R1):   Dr Cortes  2nd Call - Day Sr. Resident (R2/R3):   Dr Vargas          Reason for interval visit  (Principal Problem)   Subarachnoid hemorrhage (CMS-[facility])    Interval Problem Daily Status Update  (24 hours)   No acute changes overnight   Post SAH Day 13, BP at goal.  Patient oriented to person, place and time.   Waiting for angiogram that is planed for today   Waiting SNF       Review of Systems   Constitutional: Negative for chills and fever.   HENT: Negative for congestion.    Eyes: Negative for blurred vision and double vision.   Respiratory: Negative for cough, sputum production and shortness of breath.    Cardiovascular: Negative for chest pain, palpitations and leg swelling.   Gastrointestinal: Negative for abdominal pain, diarrhea, nausea and vomiting.   Genitourinary: Negative for dysuria and urgency.   Musculoskeletal: Negative for back pain.   Skin: Negative for rash.   Neurological: Negative for dizziness, loss of consciousness, weakness and headaches.   Psychiatric/Behavioral: Negative for depression.       Consultants/Specialty  Neurosurgery  PMA  Psychiatry    Disposition  Continue inpatient monitoring   Waiting angiogram     Quality Measures  Quality-Core Measures   Reviewed items::  Labs reviewed and Medications reviewed  Garcia catheter::  No Garcia  DVT prophylaxis pharmacological::  Contraindicated - High bleeding risk  DVT prophylaxis - mechanical:  SCDs          Physical Exam       Vitals:    18 0000 18 0400 18 0900 18 1017   BP: (!) 99/36 (!) 100/33 112/68 113/51   Pulse: 86  79 89 64   Resp: 18 20 17    Temp: 36.8 °C (98.3 °F) 36.8 °C (98.2 °F) 36.2 °C (97.2 °F)    SpO2:   93%    Weight:       Height:         Body mass index is 37.02 kg/m².    Oxygen Therapy:  Pulse Oximetry: 93 %, O2 (LPM): 0, O2 Delivery: None (Room Air)    Physical Exam   Constitutional: She is oriented to person, place, and time and well-developed, well-nourished, and in no distress.   HENT:   Head: Normocephalic and atraumatic.   Eyes: Conjunctivae and EOM are normal. Pupils are equal, round, and reactive to light. Right eye exhibits no discharge. Left eye exhibits no discharge.   Neck: Normal range of motion. Neck supple.   Cardiovascular: Normal rate, regular rhythm and intact distal pulses.    Pulmonary/Chest: Effort normal and breath sounds normal. No respiratory distress. She has no wheezes.   Abdominal: Soft. Bowel sounds are normal. She exhibits no distension. There is no tenderness.   Musculoskeletal: Normal range of motion. She exhibits no edema.   Neurological: She is alert and oriented to person, place, and time. She has normal sensation, normal strength and intact cranial nerves. No cranial nerve deficit.   Skin: Skin is warm.   Psychiatric: Affect normal.   Nursing note and vitals reviewed.        Lab Data Review:         3/12/2018  1:35 PM    Recent Labs      03/11/18   0346   SODIUM  138   POTASSIUM  4.3   CHLORIDE  104   CO2  26   BUN  21   CREATININE  0.69   CALCIUM  9.2       Recent Labs      03/11/18   0346   GLUCOSE  92       Recent Labs      03/11/18   0345   RBC  4.43   HEMOGLOBIN  13.4   HEMATOCRIT  41.4   PLATELETCT  204       Recent Labs      03/11/18   0345   WBC  7.4   NEUTSPOLYS  37.70*   LYMPHOCYTES  46.00*   MONOCYTES  13.00   EOSINOPHILS  2.30   BASOPHILS  0.70           Assessment/Plan     * Subarachnoid hemorrhage (CMS-HCC)- (present on admission)   Assessment & Plan    Plan:  -Neuro checks q2hrs  -Continue nimodipine 60 mg every 4 hours, hydralazine or labetalol PRN to keep a SBP  <150.  -SCDs.   -On aspiration seizure fall precautions    - Waiting for angiogram 3/12/18        Altered mental status- (present on admission)   Assessment & Plan    - Resolved,.  -Pt A 0X4, this am.  - overnight, agitated wanting to leave AMA last night.  - Daughter who came in from [place] yesterday is concerned patient may not be able to take care of herself if she goes home  - consult to psych placed for capacity evaluation: Patient incapacitated. Can Not make the decision to leave AMA   - Called Psychiatry to reevaluate patient. She is more oriented and no additional episode of confusion.             Cerebral aneurysm   Assessment & Plan    -CTA showed the size of the aneurysm in the left anterior temporal fossa to be 4 mm.   -s/p Angiogram on 3/1, no definitive aneurysm noted.  -As per neurosurgery- to repeat angiogram prior to discharge to ensure no aneurysm is missed    - Waiting for angiogram on 3/12/18        Hypokalemia- (present on admission)   Assessment & Plan    -Continue monitoring and replete as needed   -Goal K>4         Hyperlipidemia- (present on admission)   Assessment & Plan    -Continue home simvastatin    - lipid profile - normal         Essential hypertension- (present on admission)   Assessment & Plan    -Admitted for SAH.   -Continue home lisinopril  -Continue nimodipine for SAH.  -On clonidine, hydralazine and labetalol prn.           Kevin Sahni MD    The patient was seen by me face to face. I personally had a discussion with the patient. The case was discussed with our resident team, I examined the patient and confirmed the essential components of the history, physical examination, diagnosis and treatment plan as needed. I agree with the patient care as documented by the resident and edited as above by me. See resident's note above for complete details of service. The overall treatment regimen will be carried out as described above.      Thank you:  Delroy Banda MD, FACP  [facility]  Pager: Use Tiger Text (use resident info under treatment team for day to day floor issues)  Office: [phone] Ext. [phone]  Fax: [phone] (non PHI only)

## 2018-03-12 NOTE — PROGRESS NOTES
Neurosurgery Progress Note    Subjective:  No headache    Exam:  Pupils 3 mm, reactive  EOMI  No drift  Follows commands with all 4 extremities  No meningismus    BP  Min: 92/45  Max: 129/56  Pulse  Av.8  Min: 79  Max: 94  Resp  Av.5  Min: 14  Max: 20  Temp  Av.6 °C (97.8 °F)  Min: 36.2 °C (97.2 °F)  Max: 36.8 °C (98.3 °F)  SpO2  Av.5 %  Min: 92 %  Max: 95 %    No Data Recorded    Recent Labs      18   0345   WBC  7.4   RBC  4.43   HEMOGLOBIN  13.4   HEMATOCRIT  41.4   MCV  93.5   MCH  30.2   MCHC  32.4*   RDW  49.8   PLATELETCT  204   MPV  10.3     Recent Labs      18   0346   SODIUM  138   POTASSIUM  4.3   CHLORIDE  104   CO2  26   GLUCOSE  92   BUN  21               Intake/Output     None          No intake or output data in the 24 hours ending 18 0842         • hydrALAZINE  10-20 mg Q4HRS PRN   • cloNIDine  0.1 mg Q4HRS PRN   • haloperidol  2 mg TID   • HYDROmorphone  0.5 mg Q3HRS PRN   • Divalproex Sodium  250 mg Q12HRS   • LORazepam  0.5 mg Q6HRS PRN   • haloperidol lactate  5 mg Q4HRS PRN   • senna-docusate  2 Tab BID    And   • magnesium hydroxide  30 mL QDAY PRN    And   • bisacodyl  10 mg QDAY PRN   • acetaminophen  650 mg Q6HRS PRN   • lisinopril  20 mg DAILY   • niMODipine  60 mg Q4HRS   • Pharmacy Consult Request  1 Each PRN   • oxyCODONE immediate-release  5 mg Q3HRS PRN   • oxyCODONE immediate release  10 mg Q3HRS PRN   • MD ALERT...Do not administer NSAIDS or ASPIRIN unless ORDERED By Neurosurgery  1 Each PRN   • ondansetron  4 mg Q4HRS PRN   • dexamethasone  4 mg Once PRN   • diphenhydrAMINE  25 mg Q6HRS PRN   • scopolamine  1 Patch Q72HRS PRN   • labetalol  10 mg Q10 MIN PRN   • docusate sodium  100 mg BID   • polyethylene glycol/lytes  1 Packet BID PRN   • simvastatin  20 mg Q EVENING       Assessment and Plan:  Hospital day #14  Prophylactic anticoagulation: no         Start date/time: Contraindicated given SAH  Repeat angiogram today to definitively rule out  S: Reports decreased fetal movement for past few days. States she has only felt fetal movement once today.  Denies uterine cramping, vaginal bleeding or leaking of fluid. No headache, increase in edema, no epigastric pain.   O: Vitals: /60 (BP Location: Left arm, Patient Position: Sitting, Cuff Size: Adult Large)  Wt 244 lb (110.7 kg)  LMP 06/10/2017  Breastfeeding? No  BMI 41.88 kg/m2  BMI= Body mass index is 41.88 kg/(m^2).  Exam:  Constitutional: healthy, alert and no distress  Respiratory: respirations even and unlabored  Gastrointestinal: Abdomen soft, non-tender. Fundus measures appropriate for gestational age. Fetal heart tones 145  heard without difficulty and within normal limits. Cephalic per leopold's maneuver   : Normal external genitalia without lesions  Psychiatric: mentation appears normal and affect normal/bright    A/P  (Z34.03) Encounter for supervision of normal first pregnancy in third trimester  (primary encounter diagnosis)  Comment: decreased fetal movement  Plan: US Fetal Biophys Prof w/o Non Stress Test,         Group B strep PCR, FETAL NON-STRESS TEST          -Reactive NST. Baseline 145, moderate variability, positive accelerations, negative for decelerations, no uterine activity noted.  Fetal movement heard and noted on monitor.     -BPP ordered. Reviewed counting fetal movement.     -GBS screen completed. Encouraged patient to call with any questions or concerns.    (Z22.322) MRSA carrier  Comment: second swab collected   Plan: Methicillin Resist/Sens S. aureus PCR        Had x1 negative 2/9/2018, needs x1 more negative per  [facility] policy. Collected today.      Patient education:   -Labor signs and symptoms discussed, aware of numbers to call  -Risk/ benefits of pain medication options reviewed? YES, plans epidural   -Birth plan reviewed? Yes  -Discussed warning signs of PIH/preeclampsia and patient will monitor.  -Breastfeeding? NO, plans to provide colostrum    -Transportation plan for labor? YES   -Discussed patient options for postpartum contraception. Patient is planning unsure. States she does not want to take a pill every day, does not want something in her body.  Considering Depo however has had irregular bleeding on Depo. Not interested in Patch.  Encouraged her to research IUDs and consider.     -Pediatrician? Has not selected, considering [facility]       return to clinic this week for BPP  Return to clinic 1 weeks routine prenatal care     SHERON Worthy, CNM                  aneurysm  If angiogram shows no aneurysm then patient can proceed with discharge plans  Discussed plan of care, including no plan for surgery unless the patient has a bleed in the future, with the patient's daughter at the bedside

## 2018-03-12 NOTE — CARE PLAN
Problem: Safety  Goal: Will remain free from falls    Intervention: Assess risk factors for falls  Pt up steady when ambulating but does use call light appropriately for assistance.

## 2018-03-12 NOTE — PROGRESS NOTES
Pt rested off and on during the night. No complaints noted. Systolic BP remained 100 or below. Hourly rounding in place. Bed lowered, locked and call light within reach.

## 2018-03-13 ENCOUNTER — HOSPITAL ENCOUNTER (INPATIENT)
Facility: REHABILITATION | Age: 73
End: 2018-03-13
Attending: PHYSICAL MEDICINE & REHABILITATION | Admitting: PHYSICAL MEDICINE & REHABILITATION
Payer: MEDICARE

## 2018-03-13 ENCOUNTER — PATIENT OUTREACH (OUTPATIENT)
Dept: HEALTH INFORMATION MANAGEMENT | Facility: OTHER | Age: 73
End: 2018-03-13

## 2018-03-13 VITALS
WEIGHT: 236.33 LBS | TEMPERATURE: 98 F | OXYGEN SATURATION: 94 % | HEART RATE: 94 BPM | RESPIRATION RATE: 16 BRPM | BODY MASS INDEX: 37.09 KG/M2 | DIASTOLIC BLOOD PRESSURE: 55 MMHG | HEIGHT: 67 IN | SYSTOLIC BLOOD PRESSURE: 128 MMHG

## 2018-03-13 PROCEDURE — A9270 NON-COVERED ITEM OR SERVICE: HCPCS | Performed by: STUDENT IN AN ORGANIZED HEALTH CARE EDUCATION/TRAINING PROGRAM

## 2018-03-13 PROCEDURE — 700102 HCHG RX REV CODE 250 W/ 637 OVERRIDE(OP): Performed by: INTERNAL MEDICINE

## 2018-03-13 PROCEDURE — 97535 SELF CARE MNGMENT TRAINING: CPT

## 2018-03-13 PROCEDURE — A9270 NON-COVERED ITEM OR SERVICE: HCPCS | Performed by: INTERNAL MEDICINE

## 2018-03-13 PROCEDURE — 99239 HOSP IP/OBS DSCHRG MGMT >30: CPT | Mod: GC | Performed by: HOSPITALIST

## 2018-03-13 PROCEDURE — 700102 HCHG RX REV CODE 250 W/ 637 OVERRIDE(OP): Performed by: STUDENT IN AN ORGANIZED HEALTH CARE EDUCATION/TRAINING PROGRAM

## 2018-03-13 PROCEDURE — A9270 NON-COVERED ITEM OR SERVICE: HCPCS | Performed by: PSYCHIATRY & NEUROLOGY

## 2018-03-13 PROCEDURE — 700102 HCHG RX REV CODE 250 W/ 637 OVERRIDE(OP): Performed by: PSYCHIATRY & NEUROLOGY

## 2018-03-13 PROCEDURE — 97116 GAIT TRAINING THERAPY: CPT

## 2018-03-13 RX ORDER — HALOPERIDOL 2 MG/1
2 TABLET ORAL 3 TIMES DAILY
Qty: 100 TAB | Refills: 0 | Status: SHIPPED | OUTPATIENT
Start: 2018-03-13

## 2018-03-13 RX ORDER — DIVALPROEX SODIUM 125 MG/1
250 CAPSULE, COATED PELLETS ORAL EVERY 12 HOURS
Qty: 120 CAP | Refills: 0 | Status: SHIPPED | OUTPATIENT
Start: 2018-03-13

## 2018-03-13 RX ADMIN — NIMODIPINE 60 MG: 30 CAPSULE ORAL at 12:58

## 2018-03-13 RX ADMIN — DIVALPROEX SODIUM 250 MG: 125 CAPSULE, COATED PELLETS ORAL at 09:33

## 2018-03-13 RX ADMIN — NIMODIPINE 60 MG: 30 CAPSULE ORAL at 04:55

## 2018-03-13 RX ADMIN — LISINOPRIL 20 MG: 20 TABLET ORAL at 09:33

## 2018-03-13 RX ADMIN — HALOPERIDOL 2 MG: 2 TABLET ORAL at 09:33

## 2018-03-13 RX ADMIN — HALOPERIDOL 2 MG: 2 TABLET ORAL at 17:16

## 2018-03-13 RX ADMIN — NIMODIPINE 60 MG: 30 CAPSULE ORAL at 01:00

## 2018-03-13 RX ADMIN — NIMODIPINE 60 MG: 30 CAPSULE ORAL at 09:00

## 2018-03-13 ASSESSMENT — COGNITIVE AND FUNCTIONAL STATUS - GENERAL
HELP NEEDED FOR BATHING: A LITTLE
DAILY ACTIVITIY SCORE: 23
SUGGESTED CMS G CODE MODIFIER MOBILITY: CI
CLIMB 3 TO 5 STEPS WITH RAILING: A LITTLE
MOBILITY SCORE: 23
SUGGESTED CMS G CODE MODIFIER DAILY ACTIVITY: CI

## 2018-03-13 ASSESSMENT — GAIT ASSESSMENTS
ASSISTIVE DEVICE: FRONT WHEEL WALKER
GAIT LEVEL OF ASSIST: SUPERVISED
DISTANCE (FEET): 500
DEVIATION: INCREASED BASE OF SUPPORT;DECREASED HEEL STRIKE;DECREASED TOE OFF

## 2018-03-13 ASSESSMENT — LIFESTYLE VARIABLES: EVER_SMOKED: NEVER

## 2018-03-13 ASSESSMENT — ENCOUNTER SYMPTOMS
DIZZINESS: 0
HEMOPTYSIS: 0
VOMITING: 0
BACK PAIN: 0
DEPRESSION: 0
EYE PAIN: 0
WEAKNESS: 0
SENSORY CHANGE: 0
NAUSEA: 0
FEVER: 0
FOCAL WEAKNESS: 0
COUGH: 0
ABDOMINAL PAIN: 0
HEADACHES: 0
LOSS OF CONSCIOUSNESS: 0
SHORTNESS OF BREATH: 0
BLURRED VISION: 0
DOUBLE VISION: 0
PALPITATIONS: 0
CHILLS: 0
SPUTUM PRODUCTION: 0

## 2018-03-13 ASSESSMENT — PAIN SCALES - GENERAL: PAINLEVEL_OUTOF10: 0

## 2018-03-13 NOTE — PROGRESS NOTES
Internal Medicine Interval Note  Note Author: Kevin Sahni M.D.     Name Molly Awan 1945   Age/Sex 72 y.o. female   MRN [id number]   Code Status Full code      After 5PM or if no immediate response to page, please call for cross-coverage  Attending/Team: Dr Solo Taylor  See Patient List for primary contact information  Call [phone] to page    1st Call - Day Intern (R1):   Sebastian 2nd Call - Day Sr. Resident (R2/R3):   Alicia          Reason for interval visit  (Principal Problem)   Subarachnoid hemorrhage (CMS-HCC)    Interval Problem Daily Status Update  (24 hours)   No acute events overnight   Patient had angiogram yesterday with no complications. Neurosurgery, Dr Noguera agree with discharge. Patient will need follow up with them.   Patient denies headaches, changes in vision, SOB, chest pain, palpitations or weakness.   Neuro exam within normal. No new findings   Patient oriented to person, place and time. Since patient was transferred to the floor no problems with confusion, agitation, no need for restrains.   Patient is cooperative and follows commands.   Discussed with patient and daughter and they will like the patient to be discharge home with home health. Home Health referral placed.   Rehab referral placed.     Review of Systems   Constitutional: Negative for chills and fever.   HENT: Negative for congestion.    Eyes: Negative for blurred vision, double vision and pain.   Respiratory: Negative for cough, hemoptysis, sputum production and shortness of breath.    Cardiovascular: Negative for chest pain, palpitations and leg swelling.   Gastrointestinal: Negative for abdominal pain, nausea and vomiting.   Genitourinary: Negative for dysuria.   Musculoskeletal: Negative for back pain.   Skin: Negative for rash.   Neurological: Negative for dizziness, sensory change, focal weakness, loss of consciousness, weakness and headaches.   Psychiatric/Behavioral: Negative for  depression.       Consultants/Specialty  Neurosurgery  PMA  Psychiatry    Disposition  Discharge once Home Health arranged.     Quality Measures  Quality-Core Measures   Garcia catheter::  No Garcia  DVT prophylaxis pharmacological::  Contraindicated - High bleeding risk  DVT prophylaxis - mechanical:  SCDs          Physical Exam       Vitals:    03/12/18 2000 03/13/18 0000 03/13/18 0400 03/13/18 0800   BP: 114/40 118/43 117/49 120/48   Pulse: 68 71 69 74   Resp: 18 18 18 20   Temp: 37 °C (98.6 °F) 36.7 °C (98.1 °F) 37.4 °C (99.4 °F) 36.5 °C (97.7 °F)   SpO2: 95% 91% 91% 92%   Weight:       Height:         Body mass index is 37.02 kg/m².    Oxygen Therapy:  Pulse Oximetry: 92 %, O2 (LPM): 0, O2 Delivery: None (Room Air)    Physical Exam   Constitutional: She is oriented to person, place, and time and well-developed, well-nourished, and in no distress.   HENT:   Head: Normocephalic and atraumatic.   Mouth/Throat: Mucous membranes are not dry.   Eyes: Conjunctivae and EOM are normal. Pupils are equal, round, and reactive to light.   Neck: Normal range of motion. Neck supple.   Cardiovascular: Normal rate, regular rhythm and intact distal pulses.    Pulmonary/Chest: Effort normal and breath sounds normal. No respiratory distress. She has no wheezes.   Abdominal: Soft. Bowel sounds are normal. She exhibits no distension. There is no tenderness.   Musculoskeletal: Normal range of motion. She exhibits no edema.   Neurological: She is alert and oriented to person, place, and time. She has normal sensation, normal strength and intact cranial nerves. No cranial nerve deficit.   Skin: Skin is warm.   Psychiatric: Affect normal.   Nursing note and vitals reviewed.        Lab Data Review:         3/13/2018  7:18 AM    Recent Labs      03/11/18   0346   SODIUM  138   POTASSIUM  4.3   CHLORIDE  104   CO2  26   BUN  21   CREATININE  0.69   CALCIUM  9.2       Recent Labs      03/11/18   0346   GLUCOSE  92       Recent Labs       03/11/18   0345   RBC  4.43   HEMOGLOBIN  13.4   HEMATOCRIT  41.4   PLATELETCT  204       Recent Labs      03/11/18   0345   WBC  7.4   NEUTSPOLYS  37.70*   LYMPHOCYTES  46.00*   MONOCYTES  13.00   EOSINOPHILS  2.30   BASOPHILS  0.70           Assessment/Plan     * Subarachnoid hemorrhage (CMS-HCC)- (present on admission)   Assessment & Plan    Plan:  -Neuro checks  -SCDs.   - continue nimodipine for 4 more days   -On aspiration seizure fall precautions    - angiogram done on 3/12/18 - no complications   - Neurosurgery, Dr Noguera agree with discharge. Patient will need follow up with them. No surgery indicated at this time           Altered mental status- (present on admission)   Assessment & Plan    - Resolved,.  -Pt A 0X4, this am.  - Patient oriented to person, place and time. Since patient was transferred to the floor no problems with confusion, agitation, no need for restrains.   - Patient is cooperative and follows commands.                  Cerebral aneurysm   Assessment & Plan    -CTA showed the size of the aneurysm in the left anterior temporal fossa to be 4 mm.   -s/p Angiogram on 3/1, no definitive aneurysm noted.  -As per neurosurgery- to repeat angiogram prior to discharge to ensure no aneurysm is missed    - angiogram done on 3/12/18 - no complications and Neurosurgery agree with discharge and no surgery indicated at this time         Hypokalemia- (present on admission)   Assessment & Plan    -Continue monitoring and replete as needed   -Goal K>4         Hyperlipidemia- (present on admission)   Assessment & Plan    -Continue home simvastatin    - lipid profile - normal         Essential hypertension- (present on admission)   Assessment & Plan    -Admitted for SAH.   -Continue home lisinopril            Kevin Sahni MD    The patient was seen by me face to face. I personally had a discussion with the patient. The case was discussed with our resident team, I examined the patient and  confirmed the essential components of the history, physical examination, diagnosis and treatment plan as needed. I agree with the patient care as documented by the resident and edited as above by me. See resident's note above for complete details of service. The overall treatment regimen will be carried out as described above.   Also d/w Daughter at bedside and she is agreeable to the discharge plan.   Thank you:  Delroy Banda MD, FACP  [facility] Internal Medicine  Pager: Use Tiger Text (use resident info under treatment team for day to day floor issues)  Office: [phone] Ext. [phone]  Fax: [phone] (non PHI only)

## 2018-03-13 NOTE — PROGRESS NOTES
IR Nursing Note:      Patient underwent a diagnotic cerebral angiogram by Dr. Peralta.  Patient was placed in a supine position.  Vitals were taken every 5 minutes and remained stable during procedure (see doc flow sheet for results).  CO2 waveform capnography was monitored and remained 26 throughout procedure. An angio seal was placed in the right femoral artery.  A Tegaderm and gauzel dressing was placed over surgical site. Report called to Jennyfer ESCALANTE. Pt transported by [facility] with RN to S186-1. Pedal pulses post procedure right 3+ left 3+.    Angio-seal  6F Ref # [id number] Lot # [id number]

## 2018-03-13 NOTE — THERAPY
"Occupational Therapy Treatment completed with focus on ADLs, ADL transfers and patient education.  Functional Status:  Pt pleasant & agreeable to tx.  Pt's daughter present during tx session.  Pt was supervised with bed mobility.  Pt amb to cupboard to obtain clothing items with CGA.  Pt able to dress with supervision seated on bed.  Pt amb to sink with FWW & completed grooming tasks standing with supervision for 10 min.  Pt does become fatigued during ADL's & needs seated rest breaks.  Pt does move quickly at times but responds well to V/Q's.  Pt left sitting up in chair with daughter present.  Plan of Care: Will benefit from Occupational Therapy 3 times per week  Discharge Recommendations:  Equipment Will Continue to Assess for Equipment Needs. Post-acute therapy Discharge to a transitional care facility for continued skilled therapy services.    See \"Rehab Therapy-Acute\" Patient Summary Report for complete documentation.   "

## 2018-03-13 NOTE — FACE TO FACE
Face to Face Supporting Documentation - Home Health    The encounter with this patient was in whole or in part the primary reason for home health admission.    Date of encounter:   Patient:                    MRN:                       YOB: 2017  Molly Awan  [id number]  1945     Home health to see patient for:  Physical Therapy evaluation and treatment    Skilled need for:  Comment: patient had a recent subarrhacnoid hemorrhage and requires additional physical therapy       Homebound evidenced status by:  Needs the assistance of another person in order to leave the home. Leaving home must require a considerable and taxing effort. There must exist a normal inability to leave the home.    Community Physician to provide follow up care: No primary care provider on file.     Optional Interventions    Wound information & treatment:    Home Infusion Therapy orders:    Line/Drain/Airway:    I certify the face to face encounter for this home care referral meets the CMS requirements and the encounter/clinical assessment with the patient was, in whole, or in part, for the medical condition(s) listed above, which is the primary reason for home health care. Based on my clinical findings: the service(s) are medically necessary, support the need for home health care, and the homebound criteria are met.  I certify that this patient has had a face to face encounter by myself.  Kevin Sahni M.D. - NPI: [id number]    *Debility, frailty and advanced age in the absence of an acute deterioration or exacerbation of a condition do not qualify a patient for home health.
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
## 2018-03-13 NOTE — DISCHARGE PLANNING
Medical Social Worker    JADE met with [employer], pt, and pt's daughter Edita (POA). Pt is SNF vs HH with 24/7 caretaking. Pt and daughter are agreeable to pt going home with HH and stated that they would happily arrange caretaking services. JADE explained OOP cost for caretaking and family stated that they are capable of paying for this. Daughter stated she would be moving back to the area within the next couple of months and would begin providing care for her mother.     JADE requested HH order.    JADE got HH CHOICE from pt for Pending sale to [facility]. Pt's PCP is Dr. Aurelia Clements.   JADE faxed CHOICE to [facility].     JADE provided pt and daughter with a list of Home Care Agencies.

## 2018-03-13 NOTE — DISCHARGE PLANNING
Medical Social Worker    JADE spoke with pt's daughter. She stated that she just finished reaching out to [facility] and that they would not be able to begin working with her mother until 3/19/18. She requested that her mother got to acute rehab or SNF for a week until [facility] can accept her. '    JADE reached out to [facility] and explained above situation. JADE requested a physiatry consult order.    JADE spoke to DRE Jain and explained pt situation and that it would be a short term stay until pt could be on services with [facility].

## 2018-03-13 NOTE — PROGRESS NOTES
Neuroscience shift summary: [id number]  Orientation/Mentation- Alert and oriented x4, psych to re-evaluate  Activity- Up with SBA, steady gate  Skin/Wounds/Incision- Right groin site- bandage removed, no hematoma or bruising noted.  Lines/Drains- PIV  Telemetry- NA  Precautions- NA  Fall Risk- Low  Pain- APAP for headache  Diet- NPO since midnight, put in regular diet order for dinner  Discharge Plan- Home when ready?  Tomorrow?    Concerns/Education-  IR this afternoon for angio

## 2018-03-13 NOTE — CARE PLAN
Problem: Safety  Goal: Will remain free from injury  Outcome: PROGRESSING AS EXPECTED  Neuro checks q 4   Monitoring CMS to RLE       Problem: Pain Management  Goal: Pain level will decrease to patient's comfort goal  Outcome: PROGRESSING AS EXPECTED  Pt c/o 5/10 pain to right groin   Medicated with Tylenol and heat packs applied with + results

## 2018-03-13 NOTE — PROGRESS NOTES
"Pt returned from IR, alert, c/o right groin feeling sore. Heat packs applied to groin, CMS intact to RLE, dressing CDI, medicated with Tylenol. Pt up at 2100, sat in chair and ate 75% dinner, ambulated to bathroom with CGA and voided. Pt was previously refusing bed alarm and calling appropriately, although CNA found pt in bathroom with no treaded socks on, using walls surrounding environment to assist with ambulation. Educated pt about calling appropriately for assistance and importance of safety, pt responded \"if I fall, I fall\" and was irritated, pt was disoriented to exact date but was alert. Bed alarm placed for safety.   "

## 2018-03-13 NOTE — THERAPY
"Pt w/improved balance, gait, cognition, and activity tolerance. Pt demonstrated circumduction like gait pattern, though no c/o pain. Pt w/no LOB w/any activities. Pt appears to be at new baseline and should be able to return home w/family support and HH or outpt serivces when medically clear.     Physical Therapy Treatment completed.   Bed Mobility:  Supine to Sit: Modified Independent  Transfers: Sit to Stand: Modified Independent  Gait: Level Of Assist: Supervised with Front-Wheel Walker       Plan of Care: Will benefit from Physical Therapy 3 times per week    See \"Rehab Therapy-Acute\" Patient Summary Report for complete documentation.       "

## 2018-03-13 NOTE — DISCHARGE PLANNING
Received choice form from JADE Lazo for patients home health services, referral has been sent to [facility] per patient request.
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
## 2018-03-13 NOTE — OR SURGEON
Immediate Post- Operative Note        PostOp Diagnosis: SAH      Procedure(s): Cerebral angiogram      Estimated Blood Loss: Less than 5 ml        Complications: None            3/12/2018     6:32 PM     Abdulaziz Cisneros

## 2018-03-13 NOTE — PROGRESS NOTES
Neurosurgery Progress Note    Subjective:  No headache, angio complete    Exam:  Pupils 3 mm, reactive  AOx4  EOMI  No drift  Follows commands with all 4 extremities  Tongue ML    BP  Min: 114/40  Max: 120/48  Pulse  Av.2  Min: 65  Max: 79  Resp  Av.9  Min: 16  Max: 20  Temp  Av.9 °C (98.4 °F)  Min: 36.5 °C (97.7 °F)  Max: 37.4 °C (99.4 °F)  SpO2  Av.5 %  Min: 91 %  Max: 98 %    No Data Recorded    Recent Labs      18   0345   WBC  7.4   RBC  4.43   HEMOGLOBIN  13.4   HEMATOCRIT  41.4   MCV  93.5   MCH  30.2   MCHC  32.4*   RDW  49.8   PLATELETCT  204   MPV  10.3     Recent Labs      18   0346   SODIUM  138   POTASSIUM  4.3   CHLORIDE  104   CO2  26   GLUCOSE  92   BUN  21               Intake/Output       18 07 - 18 0659 18 - 18 0659       [id number] Total  8518-7130 Total       Intake    P.O.  150  300 450  --  -- --    P.O. 150 300 450 -- -- --    Total Intake 150 300 450 -- -- --       Output    Urine  --  -- --  --  -- --    Number of Times Voided -- 4 x 4 x -- -- --    Total Output -- -- -- -- -- --       Net I/O     150 300 450 -- -- --            Intake/Output Summary (Last 24 hours) at 18 1030  Last data filed at 18 2114   Gross per 24 hour   Intake              350 ml   Output                0 ml   Net              350 ml            • hydrALAZINE  10-20 mg Q4HRS PRN   • cloNIDine  0.1 mg Q4HRS PRN   • haloperidol  2 mg TID   • HYDROmorphone  0.5 mg Q3HRS PRN   • Divalproex Sodium  250 mg Q12HRS   • LORazepam  0.5 mg Q6HRS PRN   • haloperidol lactate  5 mg Q4HRS PRN   • senna-docusate  2 Tab BID    And   • magnesium hydroxide  30 mL QDAY PRN    And   • bisacodyl  10 mg QDAY PRN   • acetaminophen  650 mg Q6HRS PRN   • lisinopril  20 mg DAILY   • niMODipine  60 mg Q4HRS   • Pharmacy Consult Request  1 Each PRN   • oxyCODONE immediate-release  5 mg Q3HRS PRN   • oxyCODONE immediate release  10 mg Q3HRS PRN   • MD  ALERT...Do not administer NSAIDS or ASPIRIN unless ORDERED By Neurosurgery  1 Each PRN   • ondansetron  4 mg Q4HRS PRN   • dexamethasone  4 mg Once PRN   • diphenhydrAMINE  25 mg Q6HRS PRN   • scopolamine  1 Patch Q72HRS PRN   • labetalol  10 mg Q10 MIN PRN   • docusate sodium  100 mg BID   • polyethylene glycol/lytes  1 Packet BID PRN   • simvastatin  20 mg Q EVENING       Assessment and Plan:  Hospital day #15  Prophylactic anticoagulation: no         Start date/time: Contraindicated given SAH  DW Dr Noguera- ok to DC- do not need to continue nimodipine  Dr Noguera discussed plan of care, including no plan for surgery unless the patient has a bleed in the future  Follow up in two weeks no imaging

## 2018-03-13 NOTE — DISCHARGE PLANNING
Medical Social Worker    JADE discussed pt with PT. They stated that this pt is fine for DC Home per PT. Pt has cognitive issues that will require 24/7 supervision. SW discussed care taking for pt until 3/19/18 when [facility]  would be able to accept pt. Pt and daughter are agreeable to this.     Add:  SW spoke to [facility] . SW asked for them to begin submitting for auth. They stated that they will accept pt. SW requested caretaking resources for pt. They provided this SW with a phone number for [facility]    [facility]  Phone:[phone]    SW placed call as instructed from [facility] to [facility] and left a message referral for pt.     Add:  JADE discussed this plan with pt, pt's daughter, bedside/charge RN, and [employer]. Pt is okay to DC to a safe and stable environment.

## 2018-03-14 NOTE — DISCHARGE INSTRUCTIONS
Discharge Instructions          Discharged to home by car with relative. Discharged via wheelchair, hospital escort: Yes.  Special equipment needed: Not Applicable    Be sure to schedule a follow-up appointment with your primary care doctor or any specialists as instructed.     Discharge Plan:   Diet Plan: Discussed  Activity Level: Discussed  Confirmed Follow up Appointment: Patient to Call and Schedule Appointment  Confirmed Symptoms Management: Discussed  Medication Reconciliation Updated: Yes  Influenza Vaccine Indication: Not indicated: Previously immunized this influenza season and > 8 years of age    I understand that a diet low in cholesterol, fat, and sodium is recommended for good health. Unless I have been given specific instructions below for another diet, I accept this instruction as my diet prescription.   Other diet: Regular    Special Instructions: None    · Is patient discharged on Warfarin / Coumadin?   No     Depression / Suicide Risk    As you are discharged from this [facility] facility, it is important to learn how to keep safe from harming yourself.    Recognize the warning signs:  · Abrupt changes in personality, positive or negative- including increase in energy   · Giving away possessions  · Change in eating patterns- significant weight changes-  positive or negative  · Change in sleeping patterns- unable to sleep or sleeping all the time   · Unwillingness or inability to communicate  · Depression  · Unusual sadness, discouragement and loneliness  · Talk of wanting to die  · Neglect of personal appearance   · Rebelliousness- reckless behavior  · Withdrawal from people/activities they love  · Confusion- inability to concentrate     If you or a loved one observes any of these behaviors or has concerns about self-harm, here's what you can do:  · Talk about it- your feelings and reasons for harming yourself  · Remove any means that you might use to hurt yourself (examples: pills, rope,  extension cords, firearm)  · Get professional help from the community (Mental Health, Substance Abuse, psychological counseling)  · Do not be alone:Call your Safe Contact- someone whom you trust who will be there for you.  · Call your local CRISIS HOTLINE 660-5660 or 249-412-3413  · Call your local Children's Mobile Crisis Response Team [place] (167) 988-3454 or www.[employer] [place]  · Call the toll free National Suicide Prevention Hotlines   · National Suicide Prevention Lifeline 251-939-RTIK (6859)  · National Hope Line Network 800-SUICIDE (729-8928)

## 2018-03-14 NOTE — PROGRESS NOTES
Discharge orders received. Discharge instructions given o both pt and pt's daughter. Both verbalized understanding.

## 2022-01-01 NOTE — ADDENDUM NOTE
Encounter addended by: Jessica Murphy M.D. on: 4/11/2018  1:55 PM<BR>    Actions taken: Delete clinical note Informed mom of results.

## 2025-01-28 NOTE — DISCHARGE PLANNING
Received request from [facility] and Dr. Hopkins for a SNF close to the pt's home in [facility]. Faxed Choice form and left message for Melba JEONG.    31-Jan-2025